# Patient Record
Sex: FEMALE | Race: WHITE | NOT HISPANIC OR LATINO
[De-identification: names, ages, dates, MRNs, and addresses within clinical notes are randomized per-mention and may not be internally consistent; named-entity substitution may affect disease eponyms.]

---

## 2017-04-25 ENCOUNTER — APPOINTMENT (OUTPATIENT)
Dept: ELECTROPHYSIOLOGY | Facility: CLINIC | Age: 52
End: 2017-04-25

## 2017-10-10 ENCOUNTER — APPOINTMENT (OUTPATIENT)
Dept: ELECTROPHYSIOLOGY | Facility: CLINIC | Age: 52
End: 2017-10-10
Payer: COMMERCIAL

## 2017-10-10 DIAGNOSIS — I63.9 CEREBRAL INFARCTION, UNSPECIFIED: ICD-10-CM

## 2017-10-10 PROCEDURE — 93298 REM INTERROG DEV EVAL SCRMS: CPT

## 2017-11-30 ENCOUNTER — APPOINTMENT (OUTPATIENT)
Dept: ELECTROPHYSIOLOGY | Facility: CLINIC | Age: 52
End: 2017-11-30

## 2018-01-02 ENCOUNTER — APPOINTMENT (OUTPATIENT)
Dept: ELECTROPHYSIOLOGY | Facility: CLINIC | Age: 53
End: 2018-01-02
Payer: COMMERCIAL

## 2018-01-02 PROCEDURE — 93298 REM INTERROG DEV EVAL SCRMS: CPT

## 2018-01-10 ENCOUNTER — APPOINTMENT (OUTPATIENT)
Dept: ELECTROPHYSIOLOGY | Facility: CLINIC | Age: 53
End: 2018-01-10

## 2018-02-22 ENCOUNTER — APPOINTMENT (OUTPATIENT)
Dept: ELECTROPHYSIOLOGY | Facility: CLINIC | Age: 53
End: 2018-02-22

## 2018-04-10 ENCOUNTER — APPOINTMENT (OUTPATIENT)
Dept: ELECTROPHYSIOLOGY | Facility: CLINIC | Age: 53
End: 2018-04-10
Payer: COMMERCIAL

## 2018-04-10 PROCEDURE — 93298 REM INTERROG DEV EVAL SCRMS: CPT

## 2018-05-22 ENCOUNTER — APPOINTMENT (OUTPATIENT)
Dept: ELECTROPHYSIOLOGY | Facility: CLINIC | Age: 53
End: 2018-05-22

## 2018-06-11 ENCOUNTER — INPATIENT (INPATIENT)
Facility: HOSPITAL | Age: 53
LOS: 0 days | Discharge: HOME | DRG: 153 | End: 2018-06-12
Attending: HOSPITALIST | Admitting: HOSPITALIST
Payer: COMMERCIAL

## 2018-06-11 VITALS
DIASTOLIC BLOOD PRESSURE: 76 MMHG | SYSTOLIC BLOOD PRESSURE: 130 MMHG | HEART RATE: 86 BPM | HEIGHT: 61 IN | RESPIRATION RATE: 18 BRPM | TEMPERATURE: 98 F | OXYGEN SATURATION: 97 % | WEIGHT: 139.99 LBS

## 2018-06-11 DIAGNOSIS — Z98.89 OTHER SPECIFIED POSTPROCEDURAL STATES: Chronic | ICD-10-CM

## 2018-06-11 DIAGNOSIS — Z29.9 ENCOUNTER FOR PROPHYLACTIC MEASURES, UNSPECIFIED: ICD-10-CM

## 2018-06-11 DIAGNOSIS — J05.10 ACUTE EPIGLOTTITIS WITHOUT OBSTRUCTION: ICD-10-CM

## 2018-06-11 DIAGNOSIS — Z98.890 OTHER SPECIFIED POSTPROCEDURAL STATES: Chronic | ICD-10-CM

## 2018-06-11 DIAGNOSIS — G45.9 TRANSIENT CEREBRAL ISCHEMIC ATTACK, UNSPECIFIED: ICD-10-CM

## 2018-06-11 DIAGNOSIS — M67.912 UNSPECIFIED DISORDER OF SYNOVIUM AND TENDON, LEFT SHOULDER: Chronic | ICD-10-CM

## 2018-06-11 DIAGNOSIS — D72.829 ELEVATED WHITE BLOOD CELL COUNT, UNSPECIFIED: ICD-10-CM

## 2018-06-11 LAB
ALBUMIN SERPL ELPH-MCNC: 3.7 G/DL — SIGNIFICANT CHANGE UP (ref 3.3–5)
ALP SERPL-CCNC: 75 U/L — SIGNIFICANT CHANGE UP (ref 40–120)
ALT FLD-CCNC: 21 U/L — SIGNIFICANT CHANGE UP (ref 12–78)
ANION GAP SERPL CALC-SCNC: 7 MMOL/L — SIGNIFICANT CHANGE UP (ref 5–17)
ANION GAP SERPL CALC-SCNC: 7 MMOL/L — SIGNIFICANT CHANGE UP (ref 5–17)
AST SERPL-CCNC: 10 U/L — LOW (ref 15–37)
BASOPHILS # BLD AUTO: 0.09 K/UL — SIGNIFICANT CHANGE UP (ref 0–0.2)
BASOPHILS NFR BLD AUTO: 1.1 % — SIGNIFICANT CHANGE UP (ref 0–2)
BILIRUB SERPL-MCNC: 0.3 MG/DL — SIGNIFICANT CHANGE UP (ref 0.2–1.2)
BUN SERPL-MCNC: 11 MG/DL — SIGNIFICANT CHANGE UP (ref 7–23)
BUN SERPL-MCNC: 14 MG/DL — SIGNIFICANT CHANGE UP (ref 7–23)
CALCIUM SERPL-MCNC: 9 MG/DL — SIGNIFICANT CHANGE UP (ref 8.5–10.1)
CALCIUM SERPL-MCNC: 9.1 MG/DL — SIGNIFICANT CHANGE UP (ref 8.5–10.1)
CHLORIDE SERPL-SCNC: 106 MMOL/L — SIGNIFICANT CHANGE UP (ref 96–108)
CHLORIDE SERPL-SCNC: 107 MMOL/L — SIGNIFICANT CHANGE UP (ref 96–108)
CO2 SERPL-SCNC: 29 MMOL/L — SIGNIFICANT CHANGE UP (ref 22–31)
CO2 SERPL-SCNC: 30 MMOL/L — SIGNIFICANT CHANGE UP (ref 22–31)
CREAT SERPL-MCNC: 0.81 MG/DL — SIGNIFICANT CHANGE UP (ref 0.5–1.3)
CREAT SERPL-MCNC: 1 MG/DL — SIGNIFICANT CHANGE UP (ref 0.5–1.3)
EOSINOPHIL # BLD AUTO: 0.16 K/UL — SIGNIFICANT CHANGE UP (ref 0–0.5)
EOSINOPHIL NFR BLD AUTO: 1.9 % — SIGNIFICANT CHANGE UP (ref 0–6)
GLUCOSE SERPL-MCNC: 120 MG/DL — HIGH (ref 70–99)
GLUCOSE SERPL-MCNC: 98 MG/DL — SIGNIFICANT CHANGE UP (ref 70–99)
HCT VFR BLD CALC: 41.6 % — SIGNIFICANT CHANGE UP (ref 34.5–45)
HCT VFR BLD CALC: 42.8 % — SIGNIFICANT CHANGE UP (ref 34.5–45)
HGB BLD-MCNC: 14 G/DL — SIGNIFICANT CHANGE UP (ref 11.5–15.5)
HGB BLD-MCNC: 14.2 G/DL — SIGNIFICANT CHANGE UP (ref 11.5–15.5)
IMM GRANULOCYTES NFR BLD AUTO: 0.2 % — SIGNIFICANT CHANGE UP (ref 0–1.5)
LACTATE SERPL-SCNC: 1 MMOL/L — SIGNIFICANT CHANGE UP (ref 0.7–2)
LYMPHOCYTES # BLD AUTO: 2.61 K/UL — SIGNIFICANT CHANGE UP (ref 1–3.3)
LYMPHOCYTES # BLD AUTO: 31.5 % — SIGNIFICANT CHANGE UP (ref 13–44)
MCHC RBC-ENTMCNC: 29.2 PG — SIGNIFICANT CHANGE UP (ref 27–34)
MCHC RBC-ENTMCNC: 29.7 PG — SIGNIFICANT CHANGE UP (ref 27–34)
MCHC RBC-ENTMCNC: 33.2 GM/DL — SIGNIFICANT CHANGE UP (ref 32–36)
MCHC RBC-ENTMCNC: 33.7 GM/DL — SIGNIFICANT CHANGE UP (ref 32–36)
MCV RBC AUTO: 87.9 FL — SIGNIFICANT CHANGE UP (ref 80–100)
MCV RBC AUTO: 88.3 FL — SIGNIFICANT CHANGE UP (ref 80–100)
MONOCYTES # BLD AUTO: 0.65 K/UL — SIGNIFICANT CHANGE UP (ref 0–0.9)
MONOCYTES NFR BLD AUTO: 7.8 % — SIGNIFICANT CHANGE UP (ref 2–14)
NEUTROPHILS # BLD AUTO: 4.76 K/UL — SIGNIFICANT CHANGE UP (ref 1.8–7.4)
NEUTROPHILS NFR BLD AUTO: 57.5 % — SIGNIFICANT CHANGE UP (ref 43–77)
NRBC # BLD: 0 /100 WBCS — SIGNIFICANT CHANGE UP (ref 0–0)
NRBC # BLD: 0 /100 WBCS — SIGNIFICANT CHANGE UP (ref 0–0)
PLATELET # BLD AUTO: 276 K/UL — SIGNIFICANT CHANGE UP (ref 150–400)
PLATELET # BLD AUTO: 278 K/UL — SIGNIFICANT CHANGE UP (ref 150–400)
POTASSIUM SERPL-MCNC: 4 MMOL/L — SIGNIFICANT CHANGE UP (ref 3.5–5.3)
POTASSIUM SERPL-MCNC: 4.2 MMOL/L — SIGNIFICANT CHANGE UP (ref 3.5–5.3)
POTASSIUM SERPL-SCNC: 4 MMOL/L — SIGNIFICANT CHANGE UP (ref 3.5–5.3)
POTASSIUM SERPL-SCNC: 4.2 MMOL/L — SIGNIFICANT CHANGE UP (ref 3.5–5.3)
PROT SERPL-MCNC: 7 G/DL — SIGNIFICANT CHANGE UP (ref 6–8.3)
RBC # BLD: 4.71 M/UL — SIGNIFICANT CHANGE UP (ref 3.8–5.2)
RBC # BLD: 4.87 M/UL — SIGNIFICANT CHANGE UP (ref 3.8–5.2)
RBC # FLD: 12.2 % — SIGNIFICANT CHANGE UP (ref 10.3–14.5)
RBC # FLD: 12.4 % — SIGNIFICANT CHANGE UP (ref 10.3–14.5)
SODIUM SERPL-SCNC: 142 MMOL/L — SIGNIFICANT CHANGE UP (ref 135–145)
SODIUM SERPL-SCNC: 144 MMOL/L — SIGNIFICANT CHANGE UP (ref 135–145)
T3 SERPL-MCNC: 113 NG/DL — SIGNIFICANT CHANGE UP (ref 80–200)
T4 AB SER-ACNC: 8 UG/DL — SIGNIFICANT CHANGE UP (ref 4.6–12)
TSH SERPL-MCNC: 2.47 UIU/ML — SIGNIFICANT CHANGE UP (ref 0.36–3.74)
WBC # BLD: 11.43 K/UL — HIGH (ref 3.8–10.5)
WBC # BLD: 8.29 K/UL — SIGNIFICANT CHANGE UP (ref 3.8–10.5)
WBC # FLD AUTO: 11.43 K/UL — HIGH (ref 3.8–10.5)
WBC # FLD AUTO: 8.29 K/UL — SIGNIFICANT CHANGE UP (ref 3.8–10.5)

## 2018-06-11 PROCEDURE — 99223 1ST HOSP IP/OBS HIGH 75: CPT | Mod: AI,GC

## 2018-06-11 PROCEDURE — 70542 MRI ORBIT/FACE/NECK W/DYE: CPT | Mod: 26

## 2018-06-11 PROCEDURE — 99285 EMERGENCY DEPT VISIT HI MDM: CPT

## 2018-06-11 PROCEDURE — 12345: CPT | Mod: NC

## 2018-06-11 PROCEDURE — 99233 SBSQ HOSP IP/OBS HIGH 50: CPT | Mod: GC

## 2018-06-11 PROCEDURE — 70491 CT SOFT TISSUE NECK W/DYE: CPT | Mod: 26

## 2018-06-11 RX ORDER — AMPICILLIN SODIUM AND SULBACTAM SODIUM 250; 125 MG/ML; MG/ML
3 INJECTION, POWDER, FOR SUSPENSION INTRAMUSCULAR; INTRAVENOUS EVERY 6 HOURS
Qty: 0 | Refills: 0 | Status: DISCONTINUED | OUTPATIENT
Start: 2018-06-11 | End: 2018-06-12

## 2018-06-11 RX ORDER — VANCOMYCIN HCL 1 G
1000 VIAL (EA) INTRAVENOUS ONCE
Qty: 0 | Refills: 0 | Status: COMPLETED | OUTPATIENT
Start: 2018-06-11 | End: 2018-06-11

## 2018-06-11 RX ORDER — ASPIRIN/CALCIUM CARB/MAGNESIUM 324 MG
81 TABLET ORAL DAILY
Qty: 0 | Refills: 0 | Status: DISCONTINUED | OUTPATIENT
Start: 2018-06-11 | End: 2018-06-12

## 2018-06-11 RX ORDER — DEXAMETHASONE 0.5 MG/5ML
10 ELIXIR ORAL ONCE
Qty: 0 | Refills: 0 | Status: COMPLETED | OUTPATIENT
Start: 2018-06-11 | End: 2018-06-11

## 2018-06-11 RX ORDER — VANCOMYCIN HCL 1 G
VIAL (EA) INTRAVENOUS
Qty: 0 | Refills: 0 | Status: DISCONTINUED | OUTPATIENT
Start: 2018-06-11 | End: 2018-06-11

## 2018-06-11 RX ORDER — VANCOMYCIN HCL 1 G
1000 VIAL (EA) INTRAVENOUS EVERY 12 HOURS
Qty: 0 | Refills: 0 | Status: DISCONTINUED | OUTPATIENT
Start: 2018-06-11 | End: 2018-06-11

## 2018-06-11 RX ORDER — ENOXAPARIN SODIUM 100 MG/ML
40 INJECTION SUBCUTANEOUS DAILY
Qty: 0 | Refills: 0 | Status: DISCONTINUED | OUTPATIENT
Start: 2018-06-11 | End: 2018-06-12

## 2018-06-11 RX ORDER — CEFTRIAXONE 500 MG/1
1 INJECTION, POWDER, FOR SOLUTION INTRAMUSCULAR; INTRAVENOUS EVERY 24 HOURS
Qty: 0 | Refills: 0 | Status: DISCONTINUED | OUTPATIENT
Start: 2018-06-11 | End: 2018-06-11

## 2018-06-11 RX ORDER — LORATADINE 10 MG/1
10 TABLET ORAL DAILY
Qty: 0 | Refills: 0 | Status: DISCONTINUED | OUTPATIENT
Start: 2018-06-11 | End: 2018-06-12

## 2018-06-11 RX ORDER — KETOROLAC TROMETHAMINE 30 MG/ML
30 SYRINGE (ML) INJECTION ONCE
Qty: 0 | Refills: 0 | Status: DISCONTINUED | OUTPATIENT
Start: 2018-06-11 | End: 2018-06-11

## 2018-06-11 RX ORDER — PANTOPRAZOLE SODIUM 20 MG/1
40 TABLET, DELAYED RELEASE ORAL DAILY
Qty: 0 | Refills: 0 | Status: DISCONTINUED | OUTPATIENT
Start: 2018-06-11 | End: 2018-06-11

## 2018-06-11 RX ORDER — CEFTRIAXONE 500 MG/1
1 INJECTION, POWDER, FOR SOLUTION INTRAMUSCULAR; INTRAVENOUS ONCE
Qty: 0 | Refills: 0 | Status: COMPLETED | OUTPATIENT
Start: 2018-06-11 | End: 2018-06-11

## 2018-06-11 RX ORDER — DEXAMETHASONE 0.5 MG/5ML
4 ELIXIR ORAL EVERY 6 HOURS
Qty: 0 | Refills: 0 | Status: DISCONTINUED | OUTPATIENT
Start: 2018-06-11 | End: 2018-06-12

## 2018-06-11 RX ADMIN — Medication 102 MILLIGRAM(S): at 01:41

## 2018-06-11 RX ADMIN — Medication 30 MILLIGRAM(S): at 01:41

## 2018-06-11 RX ADMIN — Medication 10 MILLIGRAM(S): at 03:36

## 2018-06-11 RX ADMIN — CEFTRIAXONE 1 GRAM(S): 500 INJECTION, POWDER, FOR SOLUTION INTRAMUSCULAR; INTRAVENOUS at 03:36

## 2018-06-11 RX ADMIN — AMPICILLIN SODIUM AND SULBACTAM SODIUM 200 GRAM(S): 250; 125 INJECTION, POWDER, FOR SUSPENSION INTRAMUSCULAR; INTRAVENOUS at 23:57

## 2018-06-11 RX ADMIN — Medication 81 MILLIGRAM(S): at 13:19

## 2018-06-11 RX ADMIN — Medication 250 MILLIGRAM(S): at 03:54

## 2018-06-11 RX ADMIN — Medication 4 MILLIGRAM(S): at 06:26

## 2018-06-11 RX ADMIN — Medication 4 MILLIGRAM(S): at 11:22

## 2018-06-11 RX ADMIN — AMPICILLIN SODIUM AND SULBACTAM SODIUM 200 GRAM(S): 250; 125 INJECTION, POWDER, FOR SUSPENSION INTRAMUSCULAR; INTRAVENOUS at 17:24

## 2018-06-11 RX ADMIN — Medication 30 MILLIGRAM(S): at 02:11

## 2018-06-11 RX ADMIN — CEFTRIAXONE 100 GRAM(S): 500 INJECTION, POWDER, FOR SOLUTION INTRAMUSCULAR; INTRAVENOUS at 03:06

## 2018-06-11 RX ADMIN — PANTOPRAZOLE SODIUM 40 MILLIGRAM(S): 20 TABLET, DELAYED RELEASE ORAL at 11:22

## 2018-06-11 RX ADMIN — Medication 4 MILLIGRAM(S): at 17:24

## 2018-06-11 RX ADMIN — Medication 4 MILLIGRAM(S): at 23:57

## 2018-06-11 RX ADMIN — ENOXAPARIN SODIUM 40 MILLIGRAM(S): 100 INJECTION SUBCUTANEOUS at 13:19

## 2018-06-11 NOTE — CONSULT NOTE ADULT - SUBJECTIVE AND OBJECTIVE BOX
Patient with 2 weeks of sore throat on antibiotics and steroids. pain not responding to meds started with left ear pain and increased difficulty swallowing. came to ER ct scan of neck  read as epiglottitis.  minimal wbc elevation after steroids. still with pain. on exam necjk negative. oral exam negative. nasal deviated septum, mild.  flex laryngoscopy. mild clear edema tip of epiflottis. normal nasopharynx, base of tongue and larynx. no pooling, no erythema, no true epiglotitis and no obvious etiology for throat pain and dysphagia

## 2018-06-11 NOTE — PROVIDER CONTACT NOTE (EICU) - SITUATION
ICU admission discussed with ICU PA.  Admit to ICU with impending airway compromise from Epiglottitis, while ruling out Lemierre's disease.   Recommend Rocephin + Vanco + steroids pending cultures and review of CT with contrast of neck/soft tissue by radiologist to rule out thrombophlebitis of left IJ vein.

## 2018-06-11 NOTE — ED PROVIDER NOTE - OBJECTIVE STATEMENT
54yo female who presents with difficulty swallowing for 3 days. pt c/o throat pain, was seen by pmd and given amoxil, was not getting better and went to urgent care and given prednisone and was better for the first 2 days and now getting worse, pain with swallowing, no drooling, no change invoice, no weight loss or gain, no fever, chills, pt has appt this thursday with the ENT but could not wait due to the pain

## 2018-06-11 NOTE — H&P ADULT - HISTORY OF PRESENT ILLNESS
53 y.o. F w/ PMH of migraine and possible TIA (9/2015) presents to ED w/ difficulty swallowing and sore throat x 3 weeks.  Patient reports that after a trip to [place] she returned with a sore throat, cough and difficulty swallowing.  She reports going to her PMD and being placed on amoxicillin, and completing a 10 day course, that did not improve her symptoms.  Afterwards she went to an urgent care and was placed on prednisone, from which she felt minimal improvement for a day or two.  The day of admission patient reports 10/10 throat pain, with difficulty swallowing and pressure that radiated up into her ears.    In ED patient vitals are WNL.  Labs are unremarkable.  CT neck soft tissue shows acute epiglottitis versus supraglottitis.

## 2018-06-11 NOTE — PROGRESS NOTE ADULT - ASSESSMENT
52 yo f with history of TIA, Crones disease (in remission), tonsillectomy, admitted with sore throat found to have epiglottitis possibly supraglottitis with difficulty swallowing saliva but no stridor or tripoding.    neuro: stable  HEENT: ENT note appreciated: scope showed no evidence of epiglottitis, on unasyn and decadron, follow up MRI, feels improved  Cardio: hemodynamically stable  pulm: sating well  endo: no needs  GI: soft diet, d/c protonix  ID: on unasyn, cont. augmentin and decadron upon discharge, follow up cx, wbc increasing- likely reactive (  Heme: h/h stable, lovenox and aspirin  renal: no needs

## 2018-06-11 NOTE — ED PROVIDER NOTE - PMH
Migraine    Seasonal allergies    Transient cerebral ischemia, unspecified transient cerebral ischemia type

## 2018-06-11 NOTE — H&P ADULT - NSHPSOCIALHISTORY_GEN_ALL_CORE
Patient lives at home w/  and 3 adult children.  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  Patient denies smoking, alcohol consumption or other substance use.

## 2018-06-11 NOTE — PROGRESS NOTE ADULT - SUBJECTIVE AND OBJECTIVE BOX
HPI:  53 y.o. F w/ PMH of migraine and possible TIA (9/2015) presents to ED w/ difficulty swallowing and sore throat x 3 weeks.  Patient reports that after a trip to [place] she returned with a sore throat, cough and difficulty swallowing.  She reports going to her PMD and being placed on amoxicillin, and completing a 10 day course, that did not improve her symptoms.  Afterwards she went to an urgent care and was placed on prednisone, from which she felt minimal improvement for a day or two.  The day of admission patient reports 10/10 throat pain, with difficulty swallowing and pressure that radiated up into her ears.    In ED patient vitals are WNL.  Labs are unremarkable.  CT neck soft tissue shows acute epiglottitis versus supraglottitis. (11 Jun 2018 03:30)      INTERVAL EVENTS: Patient seen and examined. Much improved. Still with left sided neck pain. Had scope by ENT which showed no epiglotitis. Awaiting MRI to r/o retropharyngeal abscess. Denied difficulty breathing. No chest pain, palpitations.     REVIEW OF SYSTEMS:    CONSTITUTIONAL: No weakness, fevers or chills  EYES/ENT: No visual changes, +throat pain, +left sided neck pain.  RESPIRATORY: No cough, wheezing, hemoptysis; No shortness of breath  CARDIOVASCULAR: No chest pain or palpitations  GASTROINTESTINAL: No abdominal pain, nausea, vomiting, or hematemesis; No diarrhea or constipation. No melena or hematochezia.  GENITOURINARY: No dysuria, frequency or hematuria  NEUROLOGICAL: No dizziness, numbness, or weakness  SKIN: No itching, burning, rashes, or lesions   All other review of systems is negative unless indicated above.    VITAL SIGNS:  Vital Signs Last 24 Hrs  T(C): 36.8 (06-11-18 @ 21:42), Max: 36.9 (06-11-18 @ 11:15)  T(F): 98.3 (06-11-18 @ 21:42), Max: 98.4 (06-11-18 @ 11:15)  HR: 80 (06-11-18 @ 21:42) (66 - 97)  BP: 119/77 (06-11-18 @ 21:42) (102/59 - 153/74)  BP(mean): 95 (06-11-18 @ 21:00) (74 - 112)  RR: 28 (06-11-18 @ 21:00) (11 - 30)  SpO2: 96% (06-11-18 @ 21:42) (92% - 97%)      PHYSICAL EXAM:     GENERAL: no acute distress  HEENT: NC/AT, EOMI, neck supple, MMM, tenderness to palpation over left side of neck.  RESPIRATORY: LCTAB/L, no rhonchi, rales, or wheezing  CARDIOVASCULAR: RRR, no murmurs, gallops, rubs  ABDOMINAL: soft, non-tender, non-distended, positive bowel sounds   EXTREMITIES: no clubbing, cyanosis, or edema  NEUROLOGICAL: alert and oriented x 3, non-focal  SKIN: no rashes or lesions   MUSCULOSKELETAL: no gross joint deformity                          14.2   11.43 )-----------( 278      ( 11 Jun 2018 06:16 )             42.8     06-11    142  |  106  |  11  ----------------------------<  120<H>  4.2   |  29  |  0.81    Ca    9.0      11 Jun 2018 06:16    TPro  7.0  /  Alb  3.7  /  TBili  0.3  /  DBili  x   /  AST  10<L>  /  ALT  21  /  AlkPhos  75  06-11          MEDICATIONS  (STANDING):  ampicillin/sulbactam  IVPB 3 Gram(s) IV Intermittent every 6 hours  aspirin enteric coated 81 milliGRAM(s) Oral daily  dexamethasone  Injectable 4 milliGRAM(s) IV Push every 6 hours  enoxaparin Injectable 40 milliGRAM(s) SubCutaneous daily  loratadine 10 milliGRAM(s) Oral daily    MEDICATIONS  (PRN):

## 2018-06-11 NOTE — CONSULT NOTE ADULT - SUBJECTIVE AND OBJECTIVE BOX
Patient is a 53y old  Female who presents with a chief complaint of Throat pain x 3 weeks got worse past 3 days (2018 04:40)    HPI: 52yo F with history of TIA, Crones disease, seasonal allergies, tonsillectomy presents with sore throat x 4 weeks, CT scan Neck concerning for epiglottitis/supraglottitis. Patient began having sore throat around May 17th after caring for her 9 month old grandson who was sick with a URI.  At that time she took 7-10 days worth of amoxacillin that she had left over from a previous prescription.  There was minimal relief and she sought at urgent care last week who prescribed a tapering course of prednisone.  She felt better for the first 3 days but over the last two her sore throat has become worse.  She describes having difficulty swallowing food and even saliva because of the pain. Also describes green mucus production worse in the morning but also throughout the day.  Yesterday evening she attempted to go to sleep at 730pm but was unable to sleep because of the pain and difficulty breathing prompting her to come to ED.  In the ED she was afebrile, hemodynamically stable,  Her voice was initially raspy, she was given decardon with improvement in pain and voice.  She was also given toradol vancomycin and ceftriaxone.  She admits to sore throat and difficulty swallowing saliva (more because of pain then obstruction), and some difficulty breathing yesterday evening but not currently.  Denies stridor, fever, chills.         PAST MEDICAL & SURGICAL HISTORY:  Transient cerebral ischemia, unspecified transient cerebral ischemia type  Seasonal allergies  Migraine  Disorder of left rotator cuff  H/O eye surgery  History of tonsillectomy  History of appendectomy  H/O: : x 4      Review of Systems:  CONSTITUTIONAL: No fever, chills, or fatigue or recent weight changes  EYES: No eye pain, visual disturbances, or discharge  ENMT:  throat pain radiating to ears, see HPI  NECK: left sided neck pain to palpation.   RESPIRATORY: green phlegm   CARDIOVASCULAR: No chest pain, palpitations, dizziness, or leg swelling  GASTROINTESTINAL: No abdominal or epigastric pain. No nausea, vomiting, or hematemesis; No diarrhea or constipation. No melena or hematochezia.  GENITOURINARY: No dysuria, frequency, hematuria, or incontinence  NEUROLOGICAL: No headaches,, or tremors    Medications:  vancomycin  IVPB      vancomycin  IVPB 1000 milliGRAM(s) IV Intermittent every 12 hours      loratadine 10 milliGRAM(s) Oral daily        aspirin enteric coated 81 milliGRAM(s) Oral daily    pantoprazole  Injectable 40 milliGRAM(s) IV Push daily      dexamethasone  Injectable 4 milliGRAM(s) IV Push every 6 hours                  ICU Vital Signs Last 24 Hrs  T(C): 36.6 (2018 04:00), Max: 36.7 (2018 01:07)  T(F): 97.8 (2018 04:00), Max: 98.1 (2018 01:07)  HR: 72 (2018 04:00) (72 - 86)  BP: 132/83 (2018 04:00) (130/76 - 132/83)  BP(mean): --  ABP: --  ABP(mean): --  RR: 17 (2018 04:00) (17 - 18)  SpO2: 96% (2018 04:00) (96% - 97%)          I&O's Detail        LABS:                        14.0   8.29  )-----------( 276      ( 2018 01:41 )             41.6     06-11    144  |  107  |  14  ----------------------------<  98  4.0   |  30  |  1.00    Ca    9.1      2018 01:41    TPro  7.0  /  Alb  3.7  /  TBili  0.3  /  DBili  x   /  AST  10<L>  /  ALT  21  /  AlkPhos  75  06-11          CAPILLARY BLOOD GLUCOSE            CULTURES:      Physical Examination:    General: awake and alert    HEENT: Pupils equal, reactive to light.  Symmetric. non icteric sclera     PULM: clear to ascultation, no stridor    CVS: Regular rate and rhythm, no murmurs, rubs, or gallops    ABD: Soft, nondistended, nontender, normoactive bowel sounds, no masses    EXT: No edema, nontender    SKIN: Warm and well perfused, no rashes noted.    RADIOLOGY: CT neck 6/10  IMPRESSION:   Edema of the epiglottis with thickening of the aryepiglottic folds, left   greater than right, causing effacement of the left piriform sinus,   suspicious for epiglottitis/supraglottitis. Enhancing soft tissue effacing   the valleculae, likely represents lingual tonsillar hypertrophy.     ENT follow-up with laryngoscopy recommended to exclude presence of mass.     54 yo f with history of TIA, Crones disease (in remission), tonsillectomy, admitted with sore throat found to have epiglottitis possibly supraglottitis with difficulty swallowing saliva but no stridor or tripoding.  I spoke with Radiologist, there is no thrombophlebitis on CT scan.   - Continue vanco and ceftriaxone  - follow up cultures  - continue decardron 4mg q6h  - NPO  - Keep cricothyrotomy kit at bedside.  - ENT consult called  - Patient is at risk for life threatening airway compromise, monitor closely in ICU    discussed with Dr. Sheikh CUADRA       CRITICAL CARE TIME SPENT: 45 min including time spent reviewing chart, discussing care with patient, coordinating care with multidisciplinary team, ordering tests and medications, not including procedures

## 2018-06-11 NOTE — ED ADULT NURSE NOTE - CHIEF COMPLAINT QUOTE
Patient complaining of pain to left side of the neck  with pain level 10/10 and stated she cannot swallow for 2 weeks no labored breathing was seen at urgent care a week ago and was prescribed with prednisone and completed the treatment no relief was advised to go to ENT but her schedule is on Thursday and she cannot bear the pain anymore, denies taking any  pain medication.

## 2018-06-11 NOTE — ED PROVIDER NOTE - PSH
Disorder of left rotator cuff    H/O eye surgery    H/O:   x 4  History of appendectomy    History of tonsillectomy

## 2018-06-11 NOTE — CONSULT NOTE ADULT - ATTENDING COMMENTS
I interviewed patient, reviewed ct scan, examined patient performed endoscopy, discussed with intensivist and authored this note

## 2018-06-11 NOTE — ED ADULT TRIAGE NOTE - CHIEF COMPLAINT QUOTE
Patient complaining of pain to left side of the neck  with pain level 10/10 and stated she cannot swallow for 2 weeks no labored breathing was seen at urgent care a week ago and was prescribed with prednisone and completed the treatment no relief was advised to go to ENT but her schedule is on Thursday and she cannot bear the pain anymore, denies taking any  pain medication. Patient complaining of pain to left side of the neck  with pain level 10/10 and stated she cannot swallow for 2 weeks no labored breathing was seen at urgent care a week ago and was prescribed with prednisone and completed the treatment no relief was advised to go to ENT but her schedule is on Thursday and she cannot bear the pain anymore, denies taking any  pain medication. Stated she was treated for sore throat 3 weeks ago and completed amoxicillin for 7 days.

## 2018-06-11 NOTE — ED ADULT TRIAGE NOTE - NSWEIGHTCALCTOOLDRUG_GEN_A_CORE
[employer] and family at the bedside.
Bedside and Verbal shift change report received from Marcy Cavazos [facility] (offgoing nurse). Report included the following information SBAR, ED Summary, MAR and Recent Results.
Pt provided with meal and beverage. Pt tolerating meal well. Pt has mother in law at the bedside and is visible from nurses station. Pt has no complaints at this time. Will continue to monitor.
RN will call when ready to receive pt. Pt resting comfortably on stretcher at this time with family at the bedside. Pt updated on room status.
Spoke with [facility]. Will try in 15 min to see if they are ready for pt.
 used

## 2018-06-11 NOTE — H&P ADULT - NSHPREVIEWOFSYSTEMS_GEN_ALL_CORE
Constitutional: Denies fever, chills, general malaise, weight loss, weight gain, diaphoresis   HEENT: + sore throat, difficulty swallowing;  Denies runny nose, photophobia, blurry vision, double vision, eye pain, difficulty hearing, dizziness, dysphagia, epistaxis  Respiratory: + cough, sputum production; Denies shortness of breath, dyspnea on exertion,  wheezing, hemoptysis  Cardiovascular: Denies chest pain, palpitations, edema  Gastrointestinal: Denies nausea, vomiting, diarrhea, constipation, abdominal pain, melena, hematochezia   Genitourinary: Denies dysuria, hematuria, frequency, urgency, incontinence  Skin/Breast: Denies rash, hives, itching  Musculoskeletal: Denies muscle pains, muscle weakness, joint pain or swelling  Neurologic: Denies syncope, loss of consciousness, headache, weakness, dizziness, paresthesias, numbness, tingling, confusion, dementia   Psychiatric: Denies feeling anxious, depressed, suicidal, or homicidal thoughts  Endocrine: Denies cold or heat intolerance, polydipsia, polyphagia   Hematology/Oncology: Denies abnormal bruising, tender or enlarged lymph nodes   ROS negative except as noted above

## 2018-06-11 NOTE — H&P ADULT - FAMILY HISTORY
Uncle  Still living? No  Family history of heart attack, Age at diagnosis: Age Unknown  Family history of heart attack, Age at diagnosis: Age Unknown     Sibling  Still living? Yes, Estimated age: Age Unknown  Family history of arrhythmia, Age at diagnosis: Age Unknown

## 2018-06-11 NOTE — DIETITIAN INITIAL EVALUATION ADULT. - PROBLEM SELECTOR PLAN 3
IMPROVE VTE Individual Risk Assessment        RISK                                                          Points  [  ] Previous VTE                                                3  [  ] Thrombophilia                                             2  [  ] Lower limb paralysis                                   2        (unable to hold up >15 seconds)    [  ] Current Cancer                                            2         (within 6 months)  [  ] Immobilization > 24 hrs                              1  [  ] ICU/CCU stay > 24 hours                            1  [  ] Age > 60                                                    1  IMPROVE VTE Score ___0__    SCD's for DVT prophylaxis and encourage ambulation  Protonix for GI prophylaxis ( received corticosteroid use, concurrent infection, ICU setting)

## 2018-06-11 NOTE — PROGRESS NOTE ADULT - SUBJECTIVE AND OBJECTIVE BOX
Interval events: scope this AM by ENT, no evidence of epiglottitis, pt. feeling better, for MRI today.     Review of Systems:  Constitutional: no fever, chills, fatigue  HEENT: difficulty swallowing   Neuro: no headache, numbness, weakness  Resp: no cough, wheezing, shortness of breath  CVS: no chest pain, palpitations, leg swelling  GI: no abdominal pain, nausea, vomiting, diarrhea   : no dysuria, frequency, incontinence  Skin: no itching, burning, rashes, or lesions   Msk: no joint pain or swelling  Psych: no depression, anxiety    T(F): 98.4 (06-11-18 @ 11:15), Max: 98.4 (06-11-18 @ 11:15)  HR: 85 (06-11-18 @ 14:00) (66 - 93)  BP: 102/59 (06-11-18 @ 14:00) (102/59 - 138/77)  RR: 15 (06-11-18 @ 14:00) (11 - 38)  SpO2: 94% (06-11-18 @ 14:00) (94% - 97%)        CAPILLARY BLOOD GLUCOSE        I&O's Summary    10 [place] 2018 07:01  -  11 Jun 2018 07:00  --------------------------------------------------------  IN: 250 mL / OUT: 0 mL / NET: 250 mL    11 Jun 2018 07:01  -  11 Jun 2018 15:52  --------------------------------------------------------  IN: 0 mL / OUT: 350 mL / NET: -350 mL        Physical Exam:     General: awake and alert  HEENT: no evidence of pharyngeal obstruction, Pupils equal, reactive to light.  Symmetric. non icteric sclera, tender anterior neck   PULM: clear to ascultation, no stridor  CVS: Regular rate and rhythm, no murmurs, rubs, or gallops  ABD: Soft, nondistended, nontender, normoactive bowel sounds, no masses  EXT: No edema, nontender  SKIN: Warm and well perfused, no rashes noted.    Meds:  aspirin enteric coated 81 milliGRAM(s) Oral daily  enoxaparin Injectable 40 milliGRAM(s) SubCutaneous daily    ampicillin/sulbactam  IVPB 3 Gram(s) IV Intermittent every 6 hours      dexamethasone  Injectable 4 milliGRAM(s) IV Push every 6 hours    loratadine 10 milliGRAM(s) Oral daily                        14.2   11.43 )-----------( 278      ( 11 Jun 2018 06:16 )             42.8       06-11    142  |  106  |  11  ----------------------------<  120<H>  4.2   |  29  |  0.81    Ca    9.0      11 Jun 2018 06:16    TPro  7.0  /  Alb  3.7  /  TBili  0.3  /  DBili  x   /  AST  10<L>  /  ALT  21  /  AlkPhos  75  06-11    Lactate 1.0           06-11 @ 03:17      Radiology: MRI pending, scope with [place]    Bedside Lung U/S: no    Bedside Cardiac U/S: no    CENTRAL LINE: N       ROSEN: N    A-LINE: N    GLOBAL ISSUE/BEST PRACTICE:  Analgesia: no  Sedation: no  HOB elevation: yes  Stress ulcer prophylaxis: no  VTE prophylaxis: lovenox 40 daily, aspirin  Glycemic control: no  Nutrition: soft diet    CODE STATUS: full Interval events: scope this AM by ENT, no evidence of epiglottitis, pt. feeling better, for MRI today.     Review of Systems:  Constitutional: no fever, chills, fatigue  HEENT: difficulty swallowing   Neuro: no headache, numbness, weakness  Resp: no cough, wheezing, shortness of breath  CVS: no chest pain, palpitations, leg swelling  GI: no abdominal pain, nausea, vomiting, diarrhea   : no dysuria, frequency, incontinence  Skin: no itching, burning, rashes, or lesions   Msk: no joint pain or swelling  Psych: no depression, anxiety    T(F): 98.4 (06-11-18 @ 11:15), Max: 98.4 (06-11-18 @ 11:15)  HR: 85 (06-11-18 @ 14:00) (66 - 93)  BP: 102/59 (06-11-18 @ 14:00) (102/59 - 138/77)  RR: 15 (06-11-18 @ 14:00) (11 - 38)  SpO2: 94% (06-11-18 @ 14:00) (94% - 97%)      I&O's Summary    10 [place] 2018 07:01  -  11 Jun 2018 07:00  --------------------------------------------------------  IN: 250 mL / OUT: 0 mL / NET: 250 mL    Physical Exam:     General: awake and alert  HEENT: no evidence of pharyngeal obstruction, Pupils equal, reactive to light.  Symmetric. non icteric sclera, tender anterior neck   PULM: clear to ascultation, no stridor  CVS: Regular rate and rhythm, no murmurs, rubs, or gallops  ABD: Soft, nondistended, nontender, normoactive bowel sounds, no masses  EXT: No edema, nontender  SKIN: Warm and well perfused, no rashes noted.    Meds:  aspirin enteric coated 81 milliGRAM(s) Oral daily  enoxaparin Injectable 40 milliGRAM(s) SubCutaneous daily    ampicillin/sulbactam  IVPB 3 Gram(s) IV Intermittent every 6 hours      dexamethasone  Injectable 4 milliGRAM(s) IV Push every 6 hours    loratadine 10 milliGRAM(s) Oral daily                        14.2   11.43 )-----------( 278      ( 11 Jun 2018 06:16 )             42.8       06-11    142  |  106  |  11  ----------------------------<  120<H>  4.2   |  29  |  0.81    Ca    9.0      11 Jun 2018 06:16    TPro  7.0  /  Alb  3.7  /  TBili  0.3  /  DBili  x   /  AST  10<L>  /  ALT  21  /  AlkPhos  75  06-11    Lactate 1.0           06-11 @ 03:17      Radiology: MRI pending, scope with [facility]    Bedside Lung U/S: no    Bedside Cardiac U/S: no    CENTRAL LINE: N       ROSEN: N    A-LINE: N    GLOBAL ISSUE/BEST PRACTICE:  Analgesia: no  Sedation: no  HOB elevation: yes  Stress ulcer prophylaxis: no  VTE prophylaxis: lovenox 40 daily, aspirin  Glycemic control: no  Nutrition: soft diet    CODE STATUS: full

## 2018-06-11 NOTE — H&P ADULT - NSHPPHYSICALEXAM_GEN_ALL_CORE
Physical Exam:  General: Well developed, well nourished, No Acute Distress  HEENT: Normocephallic Atraumatic, PERRLA, EOMI bl, dry mucous membranes  Neck: Supple, nontender, no mass  Neurology: AA&Ox3, CN II-XII grossly intact, sensation intact  Respiratory: Clear To Auscultation B/L, No Wheezes, rhonchi or rales  CV: Regular Rate and Rhythm, +S1/S2, no murmurs, rubs or gallops  Abdominal: Soft, Non-Tender, Non-Distended +Bowel Soundsx4  Extremities: No Clubbing, cyanosis or edema, + peripheral pulses  Musculoskeletal: Normal Range of motion, no joint erythema or warmth, no joint swelling   Skin: warm, dry, normal color, no rash or abnormal lesions Physical Exam:  General: Well developed, well nourished, No Acute Distress  HEENT: Normocephallic Atraumatic, PERRLA, EOMI bl, dry mucous membranes: no signs of erythema in the oropharynx;   Neck: Supple, nontender, no mass; no cervical or supraclavicular lymphadenopathy  Neurology: AA&Ox3, CN II-XII grossly intact, sensation intact  Respiratory: Clear To Auscultation B/L, No Wheezes, rhonchi or rales  CV: Regular Rate and Rhythm, +S1/S2, no murmurs, rubs or gallops  Abdominal: Soft, Non-Tender, Non-Distended +Bowel Soundsx4  Extremities: No Clubbing, cyanosis or edema, + peripheral pulses  Musculoskeletal: Normal Range of motion, no joint erythema or warmth, no joint swelling   Skin: warm, dry, normal color, no rash or abnormal lesions

## 2018-06-11 NOTE — PROGRESS NOTE ADULT - PROBLEM SELECTOR PLAN 1
- treated with Vanco and Rocephin, now on Unasyn.  - continue Decadron  - eating and tolerating diet.   - f/u MRI

## 2018-06-11 NOTE — H&P ADULT - ASSESSMENT
53 y.o. F w/ PMH of migraine and possible TIA (9/2015) presents to ED w/ difficulty swallowing and sore throat x 3 weeks; admitted w/ epiglottitis.

## 2018-06-11 NOTE — H&P ADULT - PROBLEM SELECTOR PLAN 1
- admit to ICU for observation  - continue Vanco and Rocephin  - continue Decadron  - NPO  - ambulate as tolerated

## 2018-06-11 NOTE — H&P ADULT - PROBLEM SELECTOR PLAN 3
IMPROVE VTE Individual Risk Assessment        RISK                                                          Points  [  ] Previous VTE                                                3  [  ] Thrombophilia                                             2  [  ] Lower limb paralysis                                   2        (unable to hold up >15 seconds)    [  ] Current Cancer                                            2         (within 6 months)  [  ] Immobilization > 24 hrs                              1  [  ] ICU/CCU stay > 24 hours                            1  [  ] Age > 60                                                    1  IMPROVE VTE Score ___0__    SCD's for DVT prophylaxis and encourage ambulation  Protonix for GI prophylaxis IMPROVE VTE Individual Risk Assessment        RISK                                                          Points  [  ] Previous VTE                                                3  [  ] Thrombophilia                                             2  [  ] Lower limb paralysis                                   2        (unable to hold up >15 seconds)    [  ] Current Cancer                                            2         (within 6 months)  [  ] Immobilization > 24 hrs                              1  [  ] ICU/CCU stay > 24 hours                            1  [  ] Age > 60                                                    1  IMPROVE VTE Score ___0__    SCD's for DVT prophylaxis and encourage ambulation  Protonix for GI prophylaxis ( received corticosteroid use, concurrent infection, ICU setting)

## 2018-06-11 NOTE — CONSULT NOTE ADULT - PROBLEM SELECTOR RECOMMENDATION 9
cobtinue IV antibiotics and steroids. no resolution of pain and dysphagia MRI with contrast to evaluate soft tissue and tongue base

## 2018-06-12 ENCOUNTER — TRANSCRIPTION ENCOUNTER (OUTPATIENT)
Age: 53
End: 2018-06-12

## 2018-06-12 VITALS
RESPIRATION RATE: 16 BRPM | DIASTOLIC BLOOD PRESSURE: 62 MMHG | TEMPERATURE: 98 F | OXYGEN SATURATION: 97 % | SYSTOLIC BLOOD PRESSURE: 105 MMHG | HEART RATE: 70 BPM

## 2018-06-12 LAB
ANION GAP SERPL CALC-SCNC: 9 MMOL/L — SIGNIFICANT CHANGE UP (ref 5–17)
BASOPHILS # BLD AUTO: 0.01 K/UL — SIGNIFICANT CHANGE UP (ref 0–0.2)
BASOPHILS NFR BLD AUTO: 0.1 % — SIGNIFICANT CHANGE UP (ref 0–2)
BUN SERPL-MCNC: 16 MG/DL — SIGNIFICANT CHANGE UP (ref 7–23)
CALCIUM SERPL-MCNC: 9.2 MG/DL — SIGNIFICANT CHANGE UP (ref 8.5–10.1)
CHLORIDE SERPL-SCNC: 106 MMOL/L — SIGNIFICANT CHANGE UP (ref 96–108)
CO2 SERPL-SCNC: 27 MMOL/L — SIGNIFICANT CHANGE UP (ref 22–31)
CREAT SERPL-MCNC: 0.81 MG/DL — SIGNIFICANT CHANGE UP (ref 0.5–1.3)
EOSINOPHIL # BLD AUTO: 0 K/UL — SIGNIFICANT CHANGE UP (ref 0–0.5)
EOSINOPHIL NFR BLD AUTO: 0 % — SIGNIFICANT CHANGE UP (ref 0–6)
GLUCOSE SERPL-MCNC: 133 MG/DL — HIGH (ref 70–99)
HCT VFR BLD CALC: 38.5 % — SIGNIFICANT CHANGE UP (ref 34.5–45)
HGB BLD-MCNC: 13 G/DL — SIGNIFICANT CHANGE UP (ref 11.5–15.5)
IMM GRANULOCYTES NFR BLD AUTO: 0.6 % — SIGNIFICANT CHANGE UP (ref 0–1.5)
LYMPHOCYTES # BLD AUTO: 0.65 K/UL — LOW (ref 1–3.3)
LYMPHOCYTES # BLD AUTO: 4.2 % — LOW (ref 13–44)
MAGNESIUM SERPL-MCNC: 2.3 MG/DL — SIGNIFICANT CHANGE UP (ref 1.6–2.6)
MCHC RBC-ENTMCNC: 29.1 PG — SIGNIFICANT CHANGE UP (ref 27–34)
MCHC RBC-ENTMCNC: 33.8 GM/DL — SIGNIFICANT CHANGE UP (ref 32–36)
MCV RBC AUTO: 86.3 FL — SIGNIFICANT CHANGE UP (ref 80–100)
MONOCYTES # BLD AUTO: 0.32 K/UL — SIGNIFICANT CHANGE UP (ref 0–0.9)
MONOCYTES NFR BLD AUTO: 2 % — SIGNIFICANT CHANGE UP (ref 2–14)
NEUTROPHILS # BLD AUTO: 14.59 K/UL — HIGH (ref 1.8–7.4)
NEUTROPHILS NFR BLD AUTO: 93.1 % — HIGH (ref 43–77)
PHOSPHATE SERPL-MCNC: 4.1 MG/DL — SIGNIFICANT CHANGE UP (ref 2.5–4.5)
PLATELET # BLD AUTO: 299 K/UL — SIGNIFICANT CHANGE UP (ref 150–400)
POTASSIUM SERPL-MCNC: 4.2 MMOL/L — SIGNIFICANT CHANGE UP (ref 3.5–5.3)
POTASSIUM SERPL-SCNC: 4.2 MMOL/L — SIGNIFICANT CHANGE UP (ref 3.5–5.3)
RBC # BLD: 4.46 M/UL — SIGNIFICANT CHANGE UP (ref 3.8–5.2)
RBC # FLD: 12.1 % — SIGNIFICANT CHANGE UP (ref 10.3–14.5)
SODIUM SERPL-SCNC: 142 MMOL/L — SIGNIFICANT CHANGE UP (ref 135–145)
WBC # BLD: 15.66 K/UL — HIGH (ref 3.8–10.5)
WBC # FLD AUTO: 15.66 K/UL — HIGH (ref 3.8–10.5)

## 2018-06-12 PROCEDURE — 84480 ASSAY TRIIODOTHYRONINE (T3): CPT

## 2018-06-12 PROCEDURE — 80048 BASIC METABOLIC PNL TOTAL CA: CPT

## 2018-06-12 PROCEDURE — 83605 ASSAY OF LACTIC ACID: CPT

## 2018-06-12 PROCEDURE — 99285 EMERGENCY DEPT VISIT HI MDM: CPT | Mod: 25

## 2018-06-12 PROCEDURE — 84436 ASSAY OF TOTAL THYROXINE: CPT

## 2018-06-12 PROCEDURE — 87040 BLOOD CULTURE FOR BACTERIA: CPT

## 2018-06-12 PROCEDURE — 80053 COMPREHEN METABOLIC PANEL: CPT

## 2018-06-12 PROCEDURE — 84100 ASSAY OF PHOSPHORUS: CPT

## 2018-06-12 PROCEDURE — 84443 ASSAY THYROID STIM HORMONE: CPT

## 2018-06-12 PROCEDURE — 83735 ASSAY OF MAGNESIUM: CPT

## 2018-06-12 PROCEDURE — 87070 CULTURE OTHR SPECIMN AEROBIC: CPT

## 2018-06-12 PROCEDURE — 99239 HOSP IP/OBS DSCHRG MGMT >30: CPT

## 2018-06-12 PROCEDURE — 70542 MRI ORBIT/FACE/NECK W/DYE: CPT

## 2018-06-12 PROCEDURE — 85027 COMPLETE CBC AUTOMATED: CPT

## 2018-06-12 PROCEDURE — A9579: CPT

## 2018-06-12 PROCEDURE — 70491 CT SOFT TISSUE NECK W/DYE: CPT

## 2018-06-12 RX ORDER — ASPIRIN/CALCIUM CARB/MAGNESIUM 324 MG
1 TABLET ORAL
Qty: 0 | Refills: 0 | DISCHARGE
Start: 2018-06-12

## 2018-06-12 RX ORDER — ASPIRIN/CALCIUM CARB/MAGNESIUM 324 MG
0 TABLET ORAL
Qty: 0 | Refills: 0 | COMMUNITY

## 2018-06-12 RX ORDER — DEXAMETHASONE 0.5 MG/5ML
4 ELIXIR ORAL EVERY 8 HOURS
Qty: 0 | Refills: 0 | Status: DISCONTINUED | OUTPATIENT
Start: 2018-06-12 | End: 2018-06-12

## 2018-06-12 RX ADMIN — Medication 4 MILLIGRAM(S): at 06:43

## 2018-06-12 RX ADMIN — AMPICILLIN SODIUM AND SULBACTAM SODIUM 200 GRAM(S): 250; 125 INJECTION, POWDER, FOR SUSPENSION INTRAMUSCULAR; INTRAVENOUS at 06:44

## 2018-06-12 NOTE — DISCHARGE NOTE ADULT - CARE PROVIDER_API CALL
Cory Hameed), Otolaryngology  [address]  [place] [address]  Phone: [phone]  Fax: [phone]

## 2018-06-12 NOTE — DISCHARGE NOTE ADULT - MEDICATION SUMMARY - MEDICATIONS TO TAKE
I will START or STAY ON the medications listed below when I get home from the hospital:    Medrol Dosepak 4 mg oral tablet  -- 1 tab(s) by mouth once a day. Please use as prescribed.   -- Indication: For Epiglottitis    aspirin 81 mg oral delayed release tablet  -- 1 tab(s) by mouth once a day  -- Indication: For TIA    Allegra Allergy  -- 180 milligram(s) by mouth 2 times a day, As Needed  -- Indication: For As needed     amoxicillin-clavulanate 875 mg-125 mg oral tablet  -- 1 tab(s) by mouth every 12 hours  -- Indication: For Epiglottitis

## 2018-06-12 NOTE — DISCHARGE NOTE ADULT - MEDICATION SUMMARY - MEDICATIONS TO STOP TAKING
I will STOP taking the medications listed below when I get home from the hospital:    aspirin 325 mg oral tablet

## 2018-06-12 NOTE — PROGRESS NOTE ADULT - SUBJECTIVE AND OBJECTIVE BOX
Patient is a 53y old  Female who presents with a chief complaint of Throat pain x 3 weeks got worse past 3 days (11 Jun 2018 04:40)      INTERVAL HPI/OVERNIGHT EVENTS: No new symptoms, complaints     MEDICATIONS  (STANDING):  ampicillin/sulbactam  IVPB 3 Gram(s) IV Intermittent every 6 hours  aspirin enteric coated 81 milliGRAM(s) Oral daily  dexamethasone  Injectable 4 milliGRAM(s) IV Push every 8 hours  enoxaparin Injectable 40 milliGRAM(s) SubCutaneous daily  loratadine 10 milliGRAM(s) Oral daily    MEDICATIONS  (PRN):      Allergies    No Known Allergies    Intolerances        REVIEW OF SYSTEMS:  All 10 systems reviewed and are negative except as above   Vital Signs Last 24 Hrs  T(C): 36.8 (12 Jun 2018 04:56), Max: 36.9 (11 Jun 2018 11:15)  T(F): 98.2 (12 Jun 2018 04:56), Max: 98.4 (11 Jun 2018 11:15)  HR: 70 (12 Jun 2018 04:56) (70 - 97)  BP: 105/62 (12 Jun 2018 04:56) (102/59 - 153/74)  BP(mean): 95 (11 Jun 2018 21:00) (74 - 106)  RR: 16 (12 Jun 2018 04:56) (15 - 30)  SpO2: 97% (12 Jun 2018 04:56) (92% - 97%)    PHYSICAL EXAM:  GENERAL: NAD, well-groomed, well-developed  HEAD:  Atraumatic, Normocephalic  EYES: EOMI, PERRLA, conjunctiva and sclera clear  ENMT: No tonsillar erythema, exudates, or enlargement; Moist mucous membranes, Good dentition, No lesions  NECK: Supple, No JVD, Normal thyroid  NERVOUS SYSTEM:  Alert & Oriented X3, Good concentration; Motor Strength 5/5 B/L upper and lower extremities; DTRs 2+ intact and symmetric  CHEST/LUNG: Clear to auscultation bilaterally; No rales, rhonchi, wheezing, or rubs  HEART: Regular rate and rhythm; No murmurs, rubs, or gallops  ABDOMEN: Soft, Nontender, Nondistended; Bowel sounds present  EXTREMITIES:  2+ Peripheral Pulses, No clubbing, cyanosis, or edema  LYMPH: No lymphadenopathy noted  SKIN: No rashes or lesions    LABS:                        13.0   15.66 )-----------( 299      ( 12 Jun 2018 07:46 )             38.5     12 Jun 2018 07:46    142    |  106    |  16     ----------------------------<  133    4.2     |  27     |  0.81     Ca    9.2        12 Jun 2018 07:46  Phos  4.1       12 Jun 2018 07:46  Mg     2.3       12 Jun 2018 07:46        CAPILLARY BLOOD GLUCOSE        BLOOD CULTURE  06-11 @ 08:45   No beta hemolytic streptococci or Arcanobacterium haemolyticum isolated.  --  --  06-11 @ 08:18   No growth to date.  --  --    RADIOLOGY & ADDITIONAL TESTS:    Imaging Personally Reviewed:  [ ] YES     Consultant(s) Notes Reviewed:      Care Discussed with Consultants/Other Providers:

## 2018-06-12 NOTE — CONSULT NOTE ADULT - PROBLEM SELECTOR RECOMMENDATION 9
blood cx negative  change to augmentin 875 mg bid to complete 10 days  steroids  and needs ENT follow up.

## 2018-06-12 NOTE — PROGRESS NOTE ADULT - ATTENDING COMMENTS
53F PMH TIA and Crohn's disease (in remission) presents with 3 weeks of sore throat and difficulty swallowing, initially improved with amoxicillin and prednisone, but then worsened with steroid taper. Now clinically much improved after dexamethasone, vancomycin, and ceftriaxone. CT neck showed evidence of epiglottitis or supraglottitis without airway compromise (no stridor on exam), but laryngoscopy showed no evidence of mass, edema, or infection.    - Continue Dexamethasone 4 IV q6h, can change to oral tomorrow  - Change antibiotics to Unasyn q6h, can discharge on augmentin tomorrow if stable  - Follow-up ENT  - MRI of neck for further evaluation, r/o retropharyngeal abscess, patient is s/p tonsillectomy  - No airway compromise, on room air with SpO2>95%  - Continue with aspirin for h/o TIA  - Soft diet as tolerated  - Stable kidney function and lytes  - DVT ppx  - No lines or dorantes
Taper Decadron  Cultures NTD

## 2018-06-12 NOTE — DISCHARGE NOTE ADULT - PLAN OF CARE
resolution Complete course of antibiotics and steroids as prescribed.   F/U with ENT within 3 to 5 days Continue baby aspirin  F/u with your doctor Continue as needed meds  f/u with your doctor.

## 2018-06-12 NOTE — CONSULT NOTE ADULT - SUBJECTIVE AND OBJECTIVE BOX
[facility], Division of Infectious Diseases  ANNALISA Bray A. Lee  [phone]  [address]  53y, Female  [id number]    HPI:  53 y.o. F w/ PMH of migraine and possible TIA (2015) presents to ED w/ difficulty swallowing and sore throat x 3 weeks.    Patient reports that after a trip to [place] she returned with a sore throat, cough and difficulty swallowing.    She had greenish discharge, and took a course of amoxicillin.    Afterwards she went to an urgent care and was placed on prednisone for 6 days, from which she felt minimal improvement for a day or two.  The day of admission patient reports 10/10 throat pain, with difficulty swallowing and pressure that radiated up into her ears.    Admitted for epiglottitis and placed on unasyn and steroids.  She is feeling much better.  No fevers, no chills    PMH/PSH--  Transient cerebral ischemia, unspecified transient cerebral ischemia type  Seasonal allergies  Migraine  Disorder of left rotator cuff  H/O eye surgery  History of tonsillectomy  History of appendectomy  H/O:     Allergies--nkda      Medications--  Antibiotics: ampicillin/sulbactam  IVPB 3 Gram(s) IV Intermittent every 6 hours    Immunologic:   Other: aspirin enteric coated  dexamethasone  Injectable  enoxaparin Injectable  loratadine      Social History--  EtOH: denies ***  Tobacco: denies ***  Drug Use: denies ***    Family/Marital History--  Family history of arrhythmia (Sibling)  HTTN      Remainder not relevant to clinical concern.    Travel/Environmental/Occupational History:    travel to [place]    Review of Systems:  A >=10-point review of systems was obtained.     Pertinent positives and negatives--  Constitutional: No fevers. No Chills. No Rigors.   Eyes: no blurry vision  ENMT: + dysphagia + sore throat  Cardiovascular: No chest pain. No palpitations.  Respiratory: No shortness of breath. No cough.  Gastrointestinal: No nausea or vomiting. No diarrhea or constipation.   Genitourinary: no dysuria  Musculoskeletal: no myalgia  Skin: no rash  Neurologic: No dizziness  Psychiatric: no depression    Review of systems otherwise negative except as previously noted.    Physical Exam--  Vital Signs: T(F): 98.2 (18 @ 04:56), Max: 98.4 (18 @ 11:15)  HR: 70 (18 @ 04:56)  BP: 105/62 (18 @ 04:56)  RR: 16 (18 @ 04:56)  SpO2: 97% (18 @ 04:56)  Wt(kg): --  General: Nontoxic-appearing Female in no acute distress.  HEENT: AT/NC. Conjunctiva pink and moist. Oropharynx clear. Dentition fair.  Neck: Not rigid. No sense of mass.  Nodes: None palpable.  Lungs: Clear bilaterally without rales, wheezing or rhonchi  Heart: Regular rate and rhythm.   Abdomen: Bowel sounds present and normoactive. Soft. Nondistended. Nontender.  Back: No spinal tenderness. No costovertebral angle tenderness.   Extremities: No cyanosis or clubbing. No edema.   Skin: Warm. Dry. Good turgor. No rash. No vasculitic stigmata.  Psychiatric: Appropriate affect and mood for situation.         Laboratory & Imaging Data--  CBC                        13.0   15.66 )-----------( 299      ( 2018 07:46 )             38.5       Chemistries  -    142  |  106  |  16  ----------------------------<  133<H>  4.2   |  27  |  0.81    Ca    9.2      2018 07:46  Phos  4.1     06-12  Mg     2.3     06-12    TPro  7.0  /  Alb  3.7  /  TBili  0.3  /  DBili  x   /  AST  10<L>  /  ALT  21  /  AlkPhos  75  06-11      Culture Data    Culture - Throat, Special (collected 2018 08:45)  Source: .Throat Throat  Preliminary Report (2018 09:27):    No beta hemolytic streptococci or Arcanobacterium haemolyticum isolated.    Culture - Blood (collected 2018 08:18)  Source: .Blood Blood  Preliminary Report (2018 09:01):    No growth to date.    Culture - Blood (collected 2018 08:18)  Source: .Blood Blood-Peripheral  Preliminary Report (2018 09:01):    No growth to date.    Culture - Blood (collected 2018 08:18)  Source: .Blood Blood-Peripheral  Preliminary Report (2018 09:01):    No growth to date.        < from: MR Neck Soft Tissue Only w/ IV Cont (18 @ 19:44) >  EXAM:  MR NECK SOFT TISSUE ONLY IC                            PROCEDURE DATE:  2018          INTERPRETATION:  [facility] RADIOLOGIST PRELIMINARY REPORT    EXAM:    MR Neck Without and With Intravenous Contrast    EXAM DATE/TIME:    2018 6:48 PM    CLINICAL HISTORY:    53 years old, female; Signs and symptoms; Mass, lump, or swelling;   Other:   Retropharygneal peritonsillar; Additional info: Faxed cat scan report    TECHNIQUE:    Multiplanar magnetic resonance images of the neck without and with   intravenous contrast.    CONTRAST:    7 mL of gadavist administered intravenously.    COMPARISON:    CT NECK SOFT TISSUE WITH IV CONTRAST 2018 02:04    FINDINGS:    Nasopharynx:  Unremarkable.    Oropharynx:  There is mild prominence ofthe lingual tonsils. This   finding is   more prominent on the left. The finding correlates with the asymmetric   density   at the same level on the CT scan.    Hypopharynx:  Unremarkable.    Larynx:  Unremarkable.  Normal epiglottis.    Retropharyngeal space:  Unremarkable.    Submandibular/parotid glands:  Unremarkable.  Glands are normal in size.    Thyroid:  Unremarkable.  No enlarged or calcified nodules.    Bones/joints:  Unremarkable.    Vasculature:  Unremarkable.    Lymph nodes:  There are a few mildly enlarged lymph nodes in the deep   spaces   of the neck. Enlarged nodes measure up to 1.6 cm.   The  IMPRESSION:         Mild nonspecific adenopathy including the lingual tonsils. The finding   correlates with the asymmetric density noted on the prior CT scan. No   other   acute changes in the deep soft tissue spaces of the neck.      < end of copied text >      Assessment--      Suggestions--        Michele Moraes MD  [phone]

## 2018-06-12 NOTE — DISCHARGE NOTE ADULT - PATIENT PORTAL LINK FT
You can access the [facility] Patient Portal, offered by [facility], by registering with the following website: [email or web address]

## 2018-06-12 NOTE — DISCHARGE NOTE ADULT - CARE PLAN
Principal Discharge DX:	Epiglottitis  Goal:	resolution  Assessment and plan of treatment:	Complete course of antibiotics and steroids as prescribed.   F/U with ENT within 3 to 5 days  Secondary Diagnosis:	Transient cerebral ischemia, unspecified transient cerebral ischemia type  Assessment and plan of treatment:	Continue baby aspirin  F/u with your doctor  Secondary Diagnosis:	Seasonal allergies  Assessment and plan of treatment:	Continue as needed meds  f/u with your doctor.

## 2018-06-12 NOTE — DISCHARGE NOTE ADULT - HOSPITAL COURSE
53 y.o. F w/ PMH of migraine and possible TIA (9/2015) presents to ED w/ difficulty swallowing and sore throat x 3 weeks; admitted w/ epiglottitis. Patient was started on IV steroids and antibiotics. Was seen by ENT and ID. MRI showed mild epiglottis. Symptoms improved. Cultures were negative. Patient to f/u with ENT as out patient. Also advised to f/u with her PCP within 3 to 5 days.

## 2018-06-13 LAB
CULTURE RESULTS: SIGNIFICANT CHANGE UP
SPECIMEN SOURCE: SIGNIFICANT CHANGE UP

## 2018-06-16 LAB
CULTURE RESULTS: SIGNIFICANT CHANGE UP
SPECIMEN SOURCE: SIGNIFICANT CHANGE UP

## 2018-07-17 ENCOUNTER — APPOINTMENT (OUTPATIENT)
Dept: ELECTROPHYSIOLOGY | Facility: CLINIC | Age: 53
End: 2018-07-17

## 2020-09-18 ENCOUNTER — APPOINTMENT (OUTPATIENT)
Dept: ELECTROPHYSIOLOGY | Facility: CLINIC | Age: 55
End: 2020-09-18
Payer: COMMERCIAL

## 2020-09-18 VITALS
HEIGHT: 61 IN | BODY MASS INDEX: 28.13 KG/M2 | HEART RATE: 77 BPM | SYSTOLIC BLOOD PRESSURE: 126 MMHG | DIASTOLIC BLOOD PRESSURE: 80 MMHG | OXYGEN SATURATION: 99 % | WEIGHT: 149 LBS

## 2020-09-18 DIAGNOSIS — R42 DIZZINESS AND GIDDINESS: ICD-10-CM

## 2020-09-18 DIAGNOSIS — G45.9 TRANSIENT CEREBRAL ISCHEMIC ATTACK, UNSPECIFIED: ICD-10-CM

## 2020-09-18 DIAGNOSIS — Z95.818 PRESENCE OF OTHER CARDIAC IMPLANTS AND GRAFTS: ICD-10-CM

## 2020-09-18 PROCEDURE — 99204 OFFICE O/P NEW MOD 45 MIN: CPT

## 2020-09-18 PROCEDURE — 93000 ELECTROCARDIOGRAM COMPLETE: CPT

## 2020-09-22 ENCOUNTER — NON-APPOINTMENT (OUTPATIENT)
Age: 55
End: 2020-09-22

## 2020-09-22 NOTE — HISTORY OF PRESENT ILLNESS
[FreeTextEntry1] : 55 year old woman w/PMHx of a cryptogenic stroke in september 2015, s/p ILR implant on 11/2/15.  At the time of her stroke, she presented with dizziness, headache, and visual deficits.  She received TPA, with resolution of her symptoms.  Review of her past implantable loop recorder transmissions revealed episodes of SVT up to 170s-180s with exercise.  Today, she overall feels well and denies any chest pain, palpitations, lightheadedness/dizziness, dyspnea, presyncope or syncope.  She presents to our office for discussion of ILR explant.  I am uncertain if she had a DELMY to evaluate for a PFO since her stroke was preceded by a left DVT. She also is heterozygous for Leiden Factor V.\par \par \par \par had symptoms \par severe pain behind left knee - thinks it was a blood clot, was sitting \par \par pfo?\par put on asa and now off\par father 's family with pacemaker, and extensive heart disease\par sister and two children have irregular heartbeat (afib)? \par older sister has afib - if patient herself did develop afib, afib + leiden V\par - can take ilr out and not necessarily need one in\par did a full blood workup for disorders - has factor V\par likely heterozygous\par 4 children, 1 miscarriage \par \par refer to mic? possible pfo closure \par can possibly see a pfo on echo\par - stress test \par - need echo results from [facility] [   ] said flow was in normal range\par important to know that a pfo is better seen on a DELMY than a TTE

## 2020-09-22 NOTE — REVIEW OF SYSTEMS
[Fever] : no fever [Headache] : no headache [Blurry Vision] : no blurred vision [Seeing Double (Diplopia)] : no diplopia [Earache] : no earache [Shortness Of Breath] : no shortness of breath [Dyspnea on exertion] : not dyspnea during exertion [Chest  Pressure] : no chest pressure [Chest Pain] : no chest pain [Lower Ext Edema] : no extremity edema [Palpitations] : no palpitations [Cough] : no cough [Wheezing] : no wheezing [Abdominal Pain] : no abdominal pain [Nausea] : no nausea [Joint Pain] : no joint pain [Joint Swelling] : no joint swelling [Skin: A Rash] : no rash: [Itching] : no itching [Dizziness] : no dizziness [Tremor] : no tremor was seen [Confusion] : no confusion was observed [Memory Lapses Or Loss] : no memory lapses or loss [Easy Bleeding] : no tendency for easy bleeding [Easy Bruising] : no tendency for easy bruising

## 2020-09-22 NOTE — PHYSICAL EXAM
[General Appearance - Well Developed] : well developed [Normal Appearance] : normal appearance [General Appearance - Well Nourished] : well nourished [Normal Conjunctiva] : the conjunctiva exhibited no abnormalities [Eyelids - No Xanthelasma] : the eyelids demonstrated no xanthelasmas [FreeTextEntry1] : NCAT [Normal Jugular Venous A Waves Present] : normal jugular venous A waves present [Normal Jugular Venous V Waves Present] : normal jugular venous V waves present [No Jugular Venous Martell A Waves] : no jugular venous martell A waves [Heart Sounds] : normal S1 and S2 [Murmurs] : no murmurs present [Arterial Pulses Normal] : the arterial pulses were normal [Edema] : no peripheral edema present [Veins - Varicosity Changes] : no varicosital changes were noted in the lower extremities [Auscultation Breath Sounds / Voice Sounds] : lungs were clear to auscultation bilaterally [Lungs Percussion] : the lungs were normal to percussion [Bowel Sounds] : normal bowel sounds [Abdomen Soft] : soft [Abdomen Tenderness] : non-tender [Abnormal Walk] : normal gait [Gait - Sufficient For Exercise Testing] : the gait was sufficient for exercise testing [Nail Clubbing] : no clubbing of the fingernails [Cyanosis, Localized] : no localized cyanosis [Petechial Hemorrhages (___cm)] : no petechial hemorrhages [] : no ischemic changes [Skin Color & Pigmentation] : normal skin color and pigmentation [No Venous Stasis] : no venous stasis [Oriented To Time, Place, And Person] : oriented to person, place, and time [Affect] : the affect was normal

## 2020-09-22 NOTE — REASON FOR VISIT
[Consultation] : a consultation regarding [FreeTextEntry2] : ILR explant [FreeTextEntry1] : \par Dr. Ritesh Li - cardiologist

## 2020-09-22 NOTE — DISCUSSION/SUMMARY
[FreeTextEntry1] : She had an ILR placed which is no longer functional and it is reasonable to remove it. No indication to replace. With a history of heterozygous Leiden V and possible DVT prior to stroke, we should explore whether she was worked up for a PFO.

## 2020-10-25 DIAGNOSIS — Z01.818 ENCOUNTER FOR OTHER PREPROCEDURAL EXAMINATION: ICD-10-CM

## 2020-10-26 ENCOUNTER — APPOINTMENT (OUTPATIENT)
Dept: DISASTER EMERGENCY | Facility: CLINIC | Age: 55
End: 2020-10-26

## 2020-10-27 ENCOUNTER — NON-APPOINTMENT (OUTPATIENT)
Age: 55
End: 2020-10-27

## 2020-10-27 LAB — SARS-COV-2 N GENE NPH QL NAA+PROBE: NOT DETECTED

## 2020-10-29 ENCOUNTER — OUTPATIENT (OUTPATIENT)
Dept: OUTPATIENT SERVICES | Facility: HOSPITAL | Age: 55
LOS: 1 days | End: 2020-10-29
Payer: COMMERCIAL

## 2020-10-29 ENCOUNTER — APPOINTMENT (OUTPATIENT)
Dept: CV DIAGNOSITCS | Facility: HOSPITAL | Age: 55
End: 2020-10-29

## 2020-10-29 DIAGNOSIS — I63.9 CEREBRAL INFARCTION, UNSPECIFIED: ICD-10-CM

## 2020-10-29 DIAGNOSIS — Z98.89 OTHER SPECIFIED POSTPROCEDURAL STATES: Chronic | ICD-10-CM

## 2020-10-29 DIAGNOSIS — Z98.890 OTHER SPECIFIED POSTPROCEDURAL STATES: Chronic | ICD-10-CM

## 2020-10-29 DIAGNOSIS — M67.912 UNSPECIFIED DISORDER OF SYNOVIUM AND TENDON, LEFT SHOULDER: Chronic | ICD-10-CM

## 2020-10-29 LAB
ALBUMIN SERPL ELPH-MCNC: 4.7 G/DL — SIGNIFICANT CHANGE UP (ref 3.3–5)
ALP SERPL-CCNC: 74 U/L — SIGNIFICANT CHANGE UP (ref 40–120)
ALT FLD-CCNC: 22 U/L — SIGNIFICANT CHANGE UP (ref 10–45)
ANION GAP SERPL CALC-SCNC: 10 MMOL/L — SIGNIFICANT CHANGE UP (ref 5–17)
AST SERPL-CCNC: 19 U/L — SIGNIFICANT CHANGE UP (ref 10–40)
BILIRUB SERPL-MCNC: 0.5 MG/DL — SIGNIFICANT CHANGE UP (ref 0.2–1.2)
BUN SERPL-MCNC: 18 MG/DL — SIGNIFICANT CHANGE UP (ref 7–23)
CALCIUM SERPL-MCNC: 10 MG/DL — SIGNIFICANT CHANGE UP (ref 8.4–10.5)
CHLORIDE SERPL-SCNC: 105 MMOL/L — SIGNIFICANT CHANGE UP (ref 96–108)
CO2 SERPL-SCNC: 28 MMOL/L — SIGNIFICANT CHANGE UP (ref 22–31)
CREAT SERPL-MCNC: 0.95 MG/DL — SIGNIFICANT CHANGE UP (ref 0.5–1.3)
GLUCOSE SERPL-MCNC: 95 MG/DL — SIGNIFICANT CHANGE UP (ref 70–99)
HCT VFR BLD CALC: 46.6 % — HIGH (ref 34.5–45)
HGB BLD-MCNC: 15.1 G/DL — SIGNIFICANT CHANGE UP (ref 11.5–15.5)
INR BLD: 0.94 RATIO — SIGNIFICANT CHANGE UP (ref 0.88–1.16)
MCHC RBC-ENTMCNC: 29.3 PG — SIGNIFICANT CHANGE UP (ref 27–34)
MCHC RBC-ENTMCNC: 32.4 GM/DL — SIGNIFICANT CHANGE UP (ref 32–36)
MCV RBC AUTO: 90.5 FL — SIGNIFICANT CHANGE UP (ref 80–100)
NRBC # BLD: 0 /100 WBCS — SIGNIFICANT CHANGE UP (ref 0–0)
PLATELET # BLD AUTO: 261 K/UL — SIGNIFICANT CHANGE UP (ref 150–400)
POTASSIUM SERPL-MCNC: 4.6 MMOL/L — SIGNIFICANT CHANGE UP (ref 3.5–5.3)
POTASSIUM SERPL-SCNC: 4.6 MMOL/L — SIGNIFICANT CHANGE UP (ref 3.5–5.3)
PROT SERPL-MCNC: 6.8 G/DL — SIGNIFICANT CHANGE UP (ref 6–8.3)
PROTHROM AB SERPL-ACNC: 11.3 SEC — SIGNIFICANT CHANGE UP (ref 10.6–13.6)
RBC # BLD: 5.15 M/UL — SIGNIFICANT CHANGE UP (ref 3.8–5.2)
RBC # FLD: 12 % — SIGNIFICANT CHANGE UP (ref 10.3–14.5)
SODIUM SERPL-SCNC: 143 MMOL/L — SIGNIFICANT CHANGE UP (ref 135–145)
WBC # BLD: 4.71 K/UL — SIGNIFICANT CHANGE UP (ref 3.8–10.5)
WBC # FLD AUTO: 4.71 K/UL — SIGNIFICANT CHANGE UP (ref 3.8–10.5)

## 2020-10-29 PROCEDURE — 33286 RMVL SUBQ CAR RHYTHM MNTR: CPT

## 2020-10-29 PROCEDURE — 93312 ECHO TRANSESOPHAGEAL: CPT | Mod: 26

## 2020-10-29 PROCEDURE — 93306 TTE W/DOPPLER COMPLETE: CPT | Mod: 26

## 2020-10-29 PROCEDURE — 85027 COMPLETE CBC AUTOMATED: CPT

## 2020-10-29 PROCEDURE — 93312 ECHO TRANSESOPHAGEAL: CPT

## 2020-10-29 PROCEDURE — 93306 TTE W/DOPPLER COMPLETE: CPT

## 2020-10-29 PROCEDURE — 80053 COMPREHEN METABOLIC PANEL: CPT

## 2020-10-29 PROCEDURE — 85610 PROTHROMBIN TIME: CPT

## 2020-10-29 RX ORDER — MONTELUKAST 4 MG/1
1 TABLET, CHEWABLE ORAL
Qty: 0 | Refills: 0 | DISCHARGE

## 2020-10-29 RX ORDER — ATORVASTATIN CALCIUM 80 MG/1
1 TABLET, FILM COATED ORAL
Qty: 0 | Refills: 0 | DISCHARGE

## 2020-10-29 NOTE — H&P CARDIOLOGY - PSH
Disorder of left rotator cuff    H/O eye surgery    H/O:   x 4  History of appendectomy    History of loop recorder  imlplant in 2015  History of tonsillectomy

## 2020-10-29 NOTE — H&P CARDIOLOGY - HISTORY OF PRESENT ILLNESS
This is a 55 year old female w/PMHx of a cryptogenic stroke in september 2015, s/p ILR implant on 11/2/15 and heterozygous for Leiden Factor V. At the time of her stroke, she presented with dizziness, headache, and visual deficits. She received TPA, with resolution of her symptoms. Review of her past implantable loop recorder transmissions revealed episodes of SVT up to 170s-180s with exercise.  Patient verbalizes to overall feel well and denies any chest pain, palpitations, lightheadedness/dizziness, dyspnea, presyncope or syncope. She presents here today for ILR explant and DELMY to evaluate for a PFO since her stroke was preceded by a left DVT.       cards Dr Li              This is a 55 year old  female w/PMHx of a cryptogenic stroke in september 2015 ( with no residuals) , s/p ILR implant on 11/2/15 and heterozygous for Leiden Factor V. At the time of her stroke, she presented with dizziness, headache, and visual deficits. She received TPA, with resolution of her symptoms. Review of her past implantable loop recorder transmissions revealed episodes of SVT up to 170s-180s with exercise.  Patient verbalizes to overall feel well and denies any chest pain, palpitations, lightheadedness/dizziness, dyspnea, presyncope or syncope. She presents here today for ILR explant and DELMY to evaluate for a PFO since her stroke was preceded by a left DVT.       cards Dr Li

## 2020-10-29 NOTE — CHART NOTE - NSCHARTNOTEFT_GEN_A_CORE
· Note Type	ILR removal      Procedure Performed	 ILR Removal (96283)  • TIME OUT	Patient's first and last name, , procedure, and correct site confirmed prior to the start of procedure.  • Practitioner performing the TIME OUT	[facility]    • Procedure Date/Time	10/29/2020  08:00 am    • Informed Consent	Benefits, risks, and possible complications of procedure explained to patient/caregiver who verbalized understanding and gave written consent.  • Procedure Performed By	[facility]    • Referring Providers	Ritesh Li MD    • Indications                        Linq battery depletion placed for syncope in 2018  •Preprocedure Antibiotic 	none  • Site Prep	              Chlorhexidine    • Anatomic Location	Left 4th ICS  • Patient Position	              supine  • Procedural Sedation Used	No  • Local Anesthesia	              2% lidocaine; with EPI    • General Procedure Details	The chest wall was prepped and draped. Local anesthetic (10 cc) was infiltrated subcutaneously at the 4th left ICS and along a 45 degree inferiorly directed angle along the ILR. A nick was made with an 11-blade along the previous insertion scar. The Linq was retrieved using a small hemostat. The skin was closed with 2 staples. Excellent hemostasis obtained. A dressing was applied.  • Complications	              None    • Estimated Blood Loss	None    • Post-Procedure Care Guidelines	Verbal/written post procedure instructions were given to patient/caregiver  • Additional Procedure Details	Device Removed Medtronic Linq [id number], serial [id number] implanted 2015

## 2020-10-29 NOTE — H&P CARDIOLOGY - PMH
Cryptogenic stroke    DVT (deep venous thrombosis)    Factor V Leiden    Migraine    Seasonal allergies    SVT (supraventricular tachycardia)    Transient cerebral ischemia, unspecified transient cerebral ischemia type

## 2020-10-30 PROBLEM — I63.9 CEREBRAL INFARCTION, UNSPECIFIED: Chronic | Status: ACTIVE | Noted: 2020-10-29

## 2020-10-30 PROBLEM — D68.51 ACTIVATED PROTEIN C RESISTANCE: Chronic | Status: ACTIVE | Noted: 2020-10-29

## 2020-10-30 PROBLEM — I82.409 ACUTE EMBOLISM AND THROMBOSIS OF UNSPECIFIED DEEP VEINS OF UNSPECIFIED LOWER EXTREMITY: Chronic | Status: ACTIVE | Noted: 2020-10-29

## 2020-10-30 PROBLEM — I47.1 SUPRAVENTRICULAR TACHYCARDIA: Chronic | Status: ACTIVE | Noted: 2020-10-29

## 2020-11-04 ENCOUNTER — APPOINTMENT (OUTPATIENT)
Dept: ELECTROPHYSIOLOGY | Facility: CLINIC | Age: 55
End: 2020-11-04
Payer: COMMERCIAL

## 2020-11-04 VITALS
BODY MASS INDEX: 28.13 KG/M2 | WEIGHT: 149 LBS | OXYGEN SATURATION: 99 % | SYSTOLIC BLOOD PRESSURE: 123 MMHG | HEIGHT: 61 IN | DIASTOLIC BLOOD PRESSURE: 81 MMHG | HEART RATE: 76 BPM

## 2020-11-04 PROCEDURE — 99072 ADDL SUPL MATRL&STAF TM PHE: CPT

## 2020-11-04 PROCEDURE — 99212 OFFICE O/P EST SF 10 MIN: CPT

## 2020-11-04 NOTE — HISTORY OF PRESENT ILLNESS
[FreeTextEntry1] : 55 year old woman w/PMHx of a cryptogenic stroke in september 2015, s/p ILR implant on 11/2/15. She had no events of Afib and recently underwent loop explant on 10/29. She presents today for wound evaluation. Her site on the L sternal border is healing well w/o any erythema, swelling and drainage. Staples are intact, which is removed.

## 2020-11-04 NOTE — DISCUSSION/SUMMARY
[FreeTextEntry1] : The incision is healing well. Staples removed and she will continue to f/u with Dr. Li.

## 2020-12-25 ENCOUNTER — EMERGENCY (EMERGENCY)
Facility: HOSPITAL | Age: 55
LOS: 1 days | Discharge: HOME | End: 2020-12-25
Attending: EMERGENCY MEDICINE | Admitting: EMERGENCY MEDICINE
Payer: COMMERCIAL

## 2020-12-25 VITALS
HEART RATE: 76 BPM | SYSTOLIC BLOOD PRESSURE: 128 MMHG | OXYGEN SATURATION: 97 % | RESPIRATION RATE: 18 BRPM | DIASTOLIC BLOOD PRESSURE: 83 MMHG | HEIGHT: 61 IN | TEMPERATURE: 98 F | WEIGHT: 147.93 LBS

## 2020-12-25 VITALS
OXYGEN SATURATION: 99 % | RESPIRATION RATE: 18 BRPM | SYSTOLIC BLOOD PRESSURE: 108 MMHG | DIASTOLIC BLOOD PRESSURE: 64 MMHG | TEMPERATURE: 98 F | HEART RATE: 88 BPM

## 2020-12-25 DIAGNOSIS — M67.912 UNSPECIFIED DISORDER OF SYNOVIUM AND TENDON, LEFT SHOULDER: Chronic | ICD-10-CM

## 2020-12-25 DIAGNOSIS — Z98.89 OTHER SPECIFIED POSTPROCEDURAL STATES: Chronic | ICD-10-CM

## 2020-12-25 DIAGNOSIS — Z98.890 OTHER SPECIFIED POSTPROCEDURAL STATES: Chronic | ICD-10-CM

## 2020-12-25 PROCEDURE — 96374 THER/PROPH/DIAG INJ IV PUSH: CPT

## 2020-12-25 PROCEDURE — 99284 EMERGENCY DEPT VISIT MOD MDM: CPT | Mod: 25

## 2020-12-25 RX ORDER — CEFAZOLIN SODIUM 1 G
1000 VIAL (EA) INJECTION ONCE
Refills: 0 | Status: COMPLETED | OUTPATIENT
Start: 2020-12-25 | End: 2020-12-25

## 2020-12-25 RX ADMIN — Medication 100 MILLIGRAM(S): at 19:19

## 2020-12-25 NOTE — PROGRESS NOTE ADULT - SUBJECTIVE AND OBJECTIVE BOX
Progress Note Adult-Plastic Surgery Attending [Charted Location: [facility] ED] [Authored: 25-Dec-2020 21:42]- for Visit: [id number], Complete, Entered, Signed in Full, General    Progress Note:   · Provider Specialty	Plastic Surgery    Reason for Admission:    Reason for Admission:  · Reason for Admission	laceration of left index finger    Telehealth:    Telehealth:  · Telehealth?	No      · Subjective and Objective:   S  55 year old RHD  female who incurred an avulsion of her left index finger adjacent to her fingernail today while using a cutting knife  c/o pain and bleeding    NKDA    Pt was given a dose of IV abx while in ER    O 1cm x.3mm skin avulsion/ skin loss of the ulnar aspect of her left index finger 2mm from the nailbed      Very superficial with active bleeding    Prepped and draped with betadine  Digital block of index finger carried out with 1% lidocaine with epinephrine  Wound exploration  Cauterization of wound base    Dressed with bacitracin, bandaid and 2" amado    A & P Pt with avulsion of ulnar aspect of distal index finger-very superficial-with skin loss           Wound will heal by secondary intention           Instructions given           PO abx           F/U in office next week        Electronic Signatures:  Krisetn Mane)  (Signed 25-Dec-2020 21:52)  	Authored: Progress Note, Reason for Admission, Telehealth, Subjective and Objective      Last Updated: 25-Dec-2020 21:52 by Kristen Mane)

## 2020-12-25 NOTE — ED PROVIDER NOTE - OBJECTIVE STATEMENT
pt c/o laceration/avulsion to left index finger s/p cut by knife while slicing potatos. no other injury. tetanus utd.

## 2020-12-25 NOTE — ED PROVIDER NOTE - SKIN, MLM
Skin normal color for race, warm, dry. left indx finger approx 1cm avulsion lateral to nail distal sensation intact, cap refill < 2 secs

## 2020-12-25 NOTE — ED ADULT NURSE REASSESSMENT NOTE - NS ED NURSE REASSESS COMMENT FT1
Pt is Aox4 has a laceration on L pointer finger wrapped and bleeding is controlled. VSS antibiotic is running, will reassess.

## 2020-12-25 NOTE — ED PROVIDER NOTE - PATIENT PORTAL LINK FT
You can access the FollowMyHealth Patient Portal offered by [facility] by registering at the following website: [email or web address]. By joining [employer]’s FollowMyHealth portal, you will also be able to view your health information using other applications (apps) compatible with our system.

## 2020-12-25 NOTE — ED PROVIDER NOTE - CARE PROVIDER_API CALL
Kristen Del Rosario)  Plastic Surgery  [address] [place] [address]  Phone: [phone]  Fax: [phone]  Follow Up Time: 1-3 Days

## 2021-08-23 ENCOUNTER — APPOINTMENT (OUTPATIENT)
Dept: MAMMOGRAPHY | Facility: CLINIC | Age: 56
End: 2021-08-23

## 2021-08-23 ENCOUNTER — APPOINTMENT (OUTPATIENT)
Dept: RADIOLOGY | Facility: CLINIC | Age: 56
End: 2021-08-23

## 2021-12-01 ENCOUNTER — APPOINTMENT (OUTPATIENT)
Dept: MAMMOGRAPHY | Facility: CLINIC | Age: 56
End: 2021-12-01

## 2021-12-01 ENCOUNTER — APPOINTMENT (OUTPATIENT)
Dept: RADIOLOGY | Facility: CLINIC | Age: 56
End: 2021-12-01

## 2022-02-01 ENCOUNTER — APPOINTMENT (OUTPATIENT)
Dept: OTOLARYNGOLOGY | Facility: CLINIC | Age: 57
End: 2022-02-01
Payer: COMMERCIAL

## 2022-02-01 VITALS
SYSTOLIC BLOOD PRESSURE: 116 MMHG | HEIGHT: 61 IN | DIASTOLIC BLOOD PRESSURE: 75 MMHG | BODY MASS INDEX: 27.19 KG/M2 | HEART RATE: 73 BPM | WEIGHT: 144 LBS

## 2022-02-01 DIAGNOSIS — S09.92XA UNSPECIFIED INJURY OF NOSE, INITIAL ENCOUNTER: ICD-10-CM

## 2022-02-01 DIAGNOSIS — R09.81 NASAL CONGESTION: ICD-10-CM

## 2022-02-01 DIAGNOSIS — J38.4 EDEMA OF LARYNX: ICD-10-CM

## 2022-02-01 PROCEDURE — 99204 OFFICE O/P NEW MOD 45 MIN: CPT | Mod: 25

## 2022-02-01 PROCEDURE — 31231 NASAL ENDOSCOPY DX: CPT

## 2022-02-01 RX ORDER — METHYLPREDNISOLONE 4 MG/1
4 TABLET ORAL
Qty: 1 | Refills: 0 | Status: ACTIVE | COMMUNITY
Start: 2022-02-01 | End: 1900-01-01

## 2022-02-01 NOTE — HISTORY OF PRESENT ILLNESS
[de-identified] : Nola Meléndez is a 57 yo female who presents today for evaluation of facial trauma. She states that she slipped in the back seat of her car this morning and struck her nose. She then noticed a bump on the side of her nose. She denies any epistaxis or nasal obstruction. She has chronic nasal congestion but no change after the trauma. She denies loss of consciousness. She denies vision changes or pain/restriction of extraocular movements. She states that her bite feels normal. She denies any facial weakness or numbness.\par \par She has history of supraglottitis. She was previously on antireflux medication but is no longer taking it. She notes increased globus sensation and "clicking" sensation. She states this is because she has had more caffeine recently. She notes increased hoarseness. She denies heartburn, dysphagia, odynophagia. She denies shortness of breath or noisy breathing. She denies fevers or chills.

## 2022-02-01 NOTE — REVIEW OF SYSTEMS
[Sneezing] : sneezing [Seasonal Allergies] : seasonal allergies [Post Nasal Drip] : post nasal drip [Sinus Pain] : sinus pain [Sinus Pressure] : sinus pressure [Discolored Nasal Discharge] : discolored nasal discharge [Hoarseness] : hoarseness [Eyes Itch] : itching of the eyes [Negative] : Heme/Lymph

## 2022-02-01 NOTE — PHYSICAL EXAM
[Nasal Endoscopy Performed] : nasal endoscopy was performed, see procedure section for findings [de-identified] : Mild swelling of nasal dorsum. Straight dorsum, no palpable stepoffs. Small bump on nasal dorsum. No septal hematoma. [Midline] : trachea located in midline position [Laryngoscopy Performed] : laryngoscopy was performed, see procedure section for findings [Normal] : no rashes

## 2022-02-01 NOTE — ASSESSMENT
[FreeTextEntry1] : Nola Meléndez presents for evaluation of nasal trauma. She has mild swelling of nasal dorsum with a small bump, but no deviation or palpable stepoff. Endoscopy did not show a septal hematoma. We will obtain CT to evaluate for nasal fracture or other concomitant facial fractures.\par \par  In addition, she has a history of supraglottitis. She notes increased hoarseness and globus sensatoin recently. She is not having dysphagia or dyspnea and she states that this is nowhere near the degree of her symptoms during her supraglottitis episode. Endoscopy shows signfiicant postcricoid and arytenoid inflammation, but airway is patent and visible. Will start steroids and antireflux medication.\par \par - Medrol dose pack. The potential side effects of high-dose steroid use were discussed at length. These risks include but are not limited to: increased appetite, insomnia, fluid retention, mood swings, weight gain, change in blood pressure, high blood glucose, possible adrenal suppression, osteoporosis, avascular necrosis of the hip, menstrual irregularities (if applicable), and cataracts\par - Omeprazole 20 mg qd. Risks of omeprazole were discussed including but not limited to GI distress, osteoporosis, pneumonia.\par - Antireflux lifestyle and dietary modificatoins discussed.\par - CT maxillofacial - will call patient with result.\par - Follow up in 2 weeks. She knows to present to ED if she has any difficulty breathing or swallowing.

## 2022-02-02 ENCOUNTER — APPOINTMENT (OUTPATIENT)
Dept: CT IMAGING | Facility: CLINIC | Age: 57
End: 2022-02-02
Payer: COMMERCIAL

## 2022-02-02 ENCOUNTER — OUTPATIENT (OUTPATIENT)
Dept: OUTPATIENT SERVICES | Facility: HOSPITAL | Age: 57
LOS: 1 days | End: 2022-02-02
Payer: COMMERCIAL

## 2022-02-02 DIAGNOSIS — Z98.890 OTHER SPECIFIED POSTPROCEDURAL STATES: Chronic | ICD-10-CM

## 2022-02-02 DIAGNOSIS — Z98.89 OTHER SPECIFIED POSTPROCEDURAL STATES: Chronic | ICD-10-CM

## 2022-02-02 DIAGNOSIS — M67.912 UNSPECIFIED DISORDER OF SYNOVIUM AND TENDON, LEFT SHOULDER: Chronic | ICD-10-CM

## 2022-02-02 DIAGNOSIS — S09.92XA UNSPECIFIED INJURY OF NOSE, INITIAL ENCOUNTER: ICD-10-CM

## 2022-02-02 PROCEDURE — 70486 CT MAXILLOFACIAL W/O DYE: CPT

## 2022-02-02 PROCEDURE — 70486 CT MAXILLOFACIAL W/O DYE: CPT | Mod: 26

## 2022-02-03 ENCOUNTER — NON-APPOINTMENT (OUTPATIENT)
Age: 57
End: 2022-02-03

## 2022-02-09 ENCOUNTER — APPOINTMENT (OUTPATIENT)
Dept: OTOLARYNGOLOGY | Facility: CLINIC | Age: 57
End: 2022-02-09
Payer: COMMERCIAL

## 2022-02-09 VITALS
DIASTOLIC BLOOD PRESSURE: 71 MMHG | SYSTOLIC BLOOD PRESSURE: 126 MMHG | HEART RATE: 76 BPM | HEIGHT: 61 IN | BODY MASS INDEX: 27.19 KG/M2 | WEIGHT: 144 LBS

## 2022-02-09 DIAGNOSIS — J31.0 CHRONIC RHINITIS: ICD-10-CM

## 2022-02-09 PROCEDURE — 31575 DIAGNOSTIC LARYNGOSCOPY: CPT

## 2022-02-09 PROCEDURE — 99213 OFFICE O/P EST LOW 20 MIN: CPT | Mod: 25

## 2022-02-09 RX ORDER — AMOXICILLIN AND CLAVULANATE POTASSIUM 875; 125 MG/1; MG/1
875-125 TABLET, COATED ORAL
Qty: 20 | Refills: 0 | Status: ACTIVE | COMMUNITY
Start: 2022-02-09 | End: 1900-01-01

## 2022-02-09 NOTE — ASSESSMENT
[FreeTextEntry1] : Nola Meléndez presents for follow-up. Her previously ordered CT maxillofacial did not show evidence of a nasal fracture. She was treated for supraglottic edema with medrol dose pack and was started on omeprazole. Her supraglottic edema has improved on flexible laryngoscopy. However, her cough has worsened and she is now having green secretions. On sinonasal endoscopy, she has thin secretions bilaterally and has septal deviation to right. It is unclear if her secretions are due to postnasal drainage, possibly secondary to rhinitis versus sinusitis, or if she is having bronchitis. Will treat with antibiotics and topical regimen.\par \par - Start augmentin x 10 days. Side effects were discussed and include but are not limited to nausea, vomiting, diarrhea, and skin rash.\par - Start nasal saline sprays TID\par - Start flonase 2 sprays BID\par - Continue omeprazole\par - She will call if her cough worsens or if she develops shortness of breath, and at that time, we will obtain a CXR.\par - Follow up in 2 weeks.

## 2022-02-09 NOTE — HISTORY OF PRESENT ILLNESS
[de-identified] : Nola Meléndez is a 57 yo female who presents today for evaluation of facial trauma. She states that she slipped in the back seat of her car this morning and struck her nose. She then noticed a bump on the side of her nose. She denies any epistaxis or nasal obstruction. She has chronic nasal congestion but no change after the trauma. She denies loss of consciousness. She denies vision changes or pain/restriction of extraocular movements. She states that her bite feels normal. She denies any facial weakness or numbness.\par \par She has history of supraglottitis. She was previously on antireflux medication but is no longer taking it. She notes increased globus sensation and "clicking" sensation. She states this is because she has had more caffeine recently. She notes increased hoarseness. She denies heartburn, dysphagia, odynophagia. She denies shortness of breath or noisy breathing. She denies fevers or chills. [FreeTextEntry1] : 2/9/22 - Patient presents for follow-up. She had been previously contacted for CT maxillofacial which did not show evidence of facial fracture. She completed her steroid course for supraglottic edema. She is currently taking omeprazole. She states that she has increased cough in the morning and now has greenish discharge. She states that her voice is worse. She denies heartburn, dysphagia, or odynophagia. She denies shortness of breath but has increased cough. She denies fevers or chills. She denies nasal congestion, postnasal drainage, rhinorrhea, or sinus pressure at this time.

## 2022-02-09 NOTE — PHYSICAL EXAM
[Nasal Endoscopy Performed] : nasal endoscopy was performed, see procedure section for findings [] : septum deviated to the right [Midline] : trachea located in midline position [Laryngoscopy Performed] : laryngoscopy was performed, see procedure section for findings [Normal] : no rashes

## 2022-02-09 NOTE — DATA REVIEWED
[de-identified] : CT maxillofacial\par FINDINGS:\par \par SOFT TISSUES: Within normal limits.\par MAXILLOFACIAL BONES: No acute fracture.\par TEMPOROMANDIBULAR JOINTS: Intact.\par TYMPANOMASTOID CAVITIES: Within normal limits.\par PARANASAL SINUSES: Tiny mucous retention cysts versus polyps in the bilateral maxillary sinuses. Trace posterior left ethmoid mucosal thickening.\par DENTITION: Within normal limits.\par ORBITS: Within normal limits.\par VESSELS: Within normal limits.\par \par IMAGED PORTIONS:\par BRAIN: Within normal limits.\par CERVICAL SPINE: Degenerative changes.\par \par IMPRESSION:\par \par No acute maxillofacial bone fracture.\par

## 2022-02-16 ENCOUNTER — APPOINTMENT (OUTPATIENT)
Dept: OTOLARYNGOLOGY | Facility: CLINIC | Age: 57
End: 2022-02-16

## 2022-02-24 ENCOUNTER — APPOINTMENT (OUTPATIENT)
Dept: OTOLARYNGOLOGY | Facility: CLINIC | Age: 57
End: 2022-02-24

## 2022-03-03 ENCOUNTER — APPOINTMENT (OUTPATIENT)
Dept: OTOLARYNGOLOGY | Facility: CLINIC | Age: 57
End: 2022-03-03
Payer: COMMERCIAL

## 2022-03-03 VITALS
SYSTOLIC BLOOD PRESSURE: 118 MMHG | HEART RATE: 73 BPM | BODY MASS INDEX: 26.81 KG/M2 | DIASTOLIC BLOOD PRESSURE: 64 MMHG | WEIGHT: 142 LBS | HEIGHT: 61 IN

## 2022-03-03 DIAGNOSIS — R05.3 CHRONIC COUGH: ICD-10-CM

## 2022-03-03 PROCEDURE — 99213 OFFICE O/P EST LOW 20 MIN: CPT

## 2022-03-03 RX ORDER — AMOXICILLIN AND CLAVULANATE POTASSIUM 875; 125 MG/1; MG/1
875-125 TABLET, COATED ORAL
Qty: 20 | Refills: 0 | Status: ACTIVE | COMMUNITY
Start: 2022-03-03 | End: 1900-01-01

## 2022-03-03 NOTE — HISTORY OF PRESENT ILLNESS
[de-identified] : Nola Meléndez is a 55 yo female who presents today for evaluation of facial trauma. She states that she slipped in the back seat of her car this morning and struck her nose. She then noticed a bump on the side of her nose. She denies any epistaxis or nasal obstruction. She has chronic nasal congestion but no change after the trauma. She denies loss of consciousness. She denies vision changes or pain/restriction of extraocular movements. She states that her bite feels normal. She denies any facial weakness or numbness.\par \par She has history of supraglottitis. She was previously on antireflux medication but is no longer taking it. She notes increased globus sensation and "clicking" sensation. She states this is because she has had more caffeine recently. She notes increased hoarseness. She denies heartburn, dysphagia, odynophagia. She denies shortness of breath or noisy breathing. She denies fevers or chills. [FreeTextEntry1] : 2/9/22 - Patient presents for follow-up. She had been previously contacted for CT maxillofacial which did not show evidence of facial fracture. She completed her steroid course for supraglottic edema. She is currently taking omeprazole. She states that she has increased cough in the morning and now has greenish discharge. She states that her voice is worse. She denies heartburn, dysphagia, or odynophagia. She denies shortness of breath but has increased cough. She denies fevers or chills. She denies nasal congestion, postnasal drainage, rhinorrhea, or sinus pressure at this time.\par \par 3/3/22 - Patient presents for follow-up. She completed her course of augmentin and has been on omeprazole. She did not use any nasal saline sprays or flonase. She felt that her cough improved but after this, her symptoms returned while in [place]. She has increased cough with some greenish discharge. She continues to have some globus sensation. She notes dysphonia by the end of the day. She denies dyspnea, dysphagia or odynophagia. She denies heartburn or food regurgitation. She denies nasal congestion, rhinorrhea, postnasal drainage, or sinus pressure. She had low grade fever last week. She went to urgent care where she was prescribed steroids. She did not take this.

## 2022-03-03 NOTE — ASSESSMENT
[FreeTextEntry1] : Nola Meléndez presents for follow-up. She had initial improvement of her symptoms after antibiotic treatment but her symptoms have recurred. She is having cough with some greenish sputum as well as intermittent dysphonia. She has been on omeprazole for laryngopharyngeal reflux. Given that she had some improvement after antibiotics, will treat this upper respiratory tract infection with antibiotics again. We discussed that if her symptoms persist, we can evaluate for pulmonary etiology.\par \par - Augmentin x 10 days. Side effects were discussed and include but are not limited to nausea, vomiting, diarrhea, and skin rash.\par - Continue omeprazole.\par - Nasal saline sprays TID\par - Flonase BID\par - Follow up in 2 weeks.\par

## 2022-03-03 NOTE — PHYSICAL EXAM
[] : septum deviated to the right [Midline] : trachea located in midline position [Normal] : no rashes [Nasal Endoscopy Performed] : nasal endoscopy was performed, see procedure section for findings [Laryngoscopy Performed] : laryngoscopy was performed, see procedure section for findings

## 2022-03-25 ENCOUNTER — APPOINTMENT (OUTPATIENT)
Dept: OTOLARYNGOLOGY | Facility: CLINIC | Age: 57
End: 2022-03-25
Payer: COMMERCIAL

## 2022-03-25 VITALS
SYSTOLIC BLOOD PRESSURE: 121 MMHG | DIASTOLIC BLOOD PRESSURE: 80 MMHG | BODY MASS INDEX: 26.81 KG/M2 | HEIGHT: 61 IN | WEIGHT: 142 LBS | HEART RATE: 69 BPM

## 2022-03-25 DIAGNOSIS — R19.8 OTHER SPECIFIED SYMPTOMS AND SIGNS INVOLVING THE DIGESTIVE SYSTEM AND ABDOMEN: ICD-10-CM

## 2022-03-25 DIAGNOSIS — K21.9 GASTRO-ESOPHAGEAL REFLUX DISEASE W/OUT ESOPHAGITIS: ICD-10-CM

## 2022-03-25 PROCEDURE — 99213 OFFICE O/P EST LOW 20 MIN: CPT | Mod: 25

## 2022-03-25 PROCEDURE — 31575 DIAGNOSTIC LARYNGOSCOPY: CPT

## 2022-03-25 NOTE — HISTORY OF PRESENT ILLNESS
[de-identified] : Nola Meléndez is a 57 yo female who presents today for evaluation of facial trauma. She states that she slipped in the back seat of her car this morning and struck her nose. She then noticed a bump on the side of her nose. She denies any epistaxis or nasal obstruction. She has chronic nasal congestion but no change after the trauma. She denies loss of consciousness. She denies vision changes or pain/restriction of extraocular movements. She states that her bite feels normal. She denies any facial weakness or numbness.\par \par She has history of supraglottitis. She was previously on antireflux medication but is no longer taking it. She notes increased globus sensation and "clicking" sensation. She states this is because she has had more caffeine recently. She notes increased hoarseness. She denies heartburn, dysphagia, odynophagia. She denies shortness of breath or noisy breathing. She denies fevers or chills. [FreeTextEntry1] : 2/9/22 - Patient presents for follow-up. She had been previously contacted for CT maxillofacial which did not show evidence of facial fracture. She completed her steroid course for supraglottic edema. She is currently taking omeprazole. She states that she has increased cough in the morning and now has greenish discharge. She states that her voice is worse. She denies heartburn, dysphagia, or odynophagia. She denies shortness of breath but has increased cough. She denies fevers or chills. She denies nasal congestion, postnasal drainage, rhinorrhea, or sinus pressure at this time.\par \par 3/3/22 - Patient presents for follow-up. She completed her course of augmentin and has been on omeprazole. She did not use any nasal saline sprays or flonase. She felt that her cough improved but after this, her symptoms returned while in [place]. She has increased cough with some greenish discharge. She continues to have some globus sensation. She notes dysphonia by the end of the day. She denies dyspnea, dysphagia or odynophagia. She denies heartburn or food regurgitation. She denies nasal congestion, rhinorrhea, postnasal drainage, or sinus pressure. She had low grade fever last week. She went to urgent care where she was prescribed steroids. She did not take this.\par \par 3/25/22 - Ms. Meléndez presents for follow-up. She notes that her cough has improved after antibiotics. She denies dysphagia, odynophagia, or dyspnea. She denies dysphonia. She states that she continues to have globus sensation. She recently had increased reflux secondary to food intake. She is taking her omeprazole.

## 2022-03-25 NOTE — ASSESSMENT
[FreeTextEntry1] : Nola Meléndez presents for follow-up. Her cough has resolved after antibiotic course. She continues to have mild globus sensation and on laryngoscopy, she has postcricoid inflammation secondary to laryngopharyngeal reflux. She notes not adhering to her diet recently which may have led to an exacerbation. She is on omeprazole.\par \par - Continue antireflux lifestyle and dietary modifications.\par - Continue omeprazole.\par - Patient is seeing her GI physician soon who can add medication to her regimen.\par - Follow up in 3 months.

## 2022-04-29 ENCOUNTER — RX RENEWAL (OUTPATIENT)
Age: 57
End: 2022-04-29

## 2022-04-29 RX ORDER — OMEPRAZOLE 20 MG/1
20 CAPSULE, DELAYED RELEASE ORAL
Qty: 90 | Refills: 0 | Status: ACTIVE | COMMUNITY
Start: 2022-02-01 | End: 1900-01-01

## 2022-06-13 ENCOUNTER — APPOINTMENT (OUTPATIENT)
Dept: OTOLARYNGOLOGY | Facility: CLINIC | Age: 57
End: 2022-06-13

## 2022-10-26 ENCOUNTER — APPOINTMENT (OUTPATIENT)
Dept: MAMMOGRAPHY | Facility: CLINIC | Age: 57
End: 2022-10-26

## 2022-10-26 ENCOUNTER — APPOINTMENT (OUTPATIENT)
Dept: RADIOLOGY | Facility: CLINIC | Age: 57
End: 2022-10-26

## 2022-10-26 PROCEDURE — 77063 BREAST TOMOSYNTHESIS BI: CPT

## 2022-10-26 PROCEDURE — 77080 DXA BONE DENSITY AXIAL: CPT

## 2022-10-26 PROCEDURE — 77067 SCR MAMMO BI INCL CAD: CPT

## 2023-02-23 NOTE — ED ADULT NURSE REASSESSMENT NOTE - STATUS
Thank you for choosing us for your care. I have placed an order for a prescription so that you can start treatment. View your full visit summary for details by clicking on the link below. Your pharmacist will able to address any questions you may have about the medication.     If you're not feeling better within 5-7 days, please schedule an appointment.  You can schedule an appointment right here in [facility], or call [phone]  If the visit is for the same symptoms as your eVisit, we'll refund the cost of your eVisit if seen within seven days.     awaiting hand specialist.

## 2023-11-01 ENCOUNTER — APPOINTMENT (OUTPATIENT)
Dept: MAMMOGRAPHY | Facility: CLINIC | Age: 58
End: 2023-11-01

## 2023-11-09 ENCOUNTER — APPOINTMENT (OUTPATIENT)
Dept: OBGYN | Facility: CLINIC | Age: 58
End: 2023-11-09
Payer: COMMERCIAL

## 2023-11-09 PROCEDURE — 99396 PREV VISIT EST AGE 40-64: CPT

## 2023-11-21 NOTE — ED ADULT NURSE NOTE - CONTEXT
unknown Doxycycline Counseling:  Patient counseled regarding possible photosensitivity and increased risk for sunburn.  Patient instructed to avoid sunlight, if possible.  When exposed to sunlight, patients should wear protective clothing, sunglasses, and sunscreen.  The patient was instructed to call the office immediately if the following severe adverse effects occur:  hearing changes, easy bruising/bleeding, severe headache, or vision changes.  The patient verbalized understanding of the proper use and possible adverse effects of doxycycline.  All of the patient's questions and concerns were addressed.

## 2025-01-23 ENCOUNTER — APPOINTMENT (OUTPATIENT)
Dept: OBGYN | Facility: CLINIC | Age: 60
End: 2025-01-23
Payer: COMMERCIAL

## 2025-01-23 ENCOUNTER — APPOINTMENT (OUTPATIENT)
Dept: OBGYN | Facility: CLINIC | Age: 60
End: 2025-01-23

## 2025-01-23 PROCEDURE — 96127 BRIEF EMOTIONAL/BEHAV ASSMT: CPT

## 2025-01-23 PROCEDURE — 99459 PELVIC EXAMINATION: CPT

## 2025-01-23 PROCEDURE — 76856 US EXAM PELVIC COMPLETE: CPT

## 2025-01-23 PROCEDURE — 76830 TRANSVAGINAL US NON-OB: CPT | Mod: 59

## 2025-01-23 PROCEDURE — 99396 PREV VISIT EST AGE 40-64: CPT | Mod: 25

## 2025-05-02 ENCOUNTER — APPOINTMENT (OUTPATIENT)
Dept: MAMMOGRAPHY | Facility: CLINIC | Age: 60
End: 2025-05-02
Payer: COMMERCIAL

## 2025-05-02 ENCOUNTER — OUTPATIENT (OUTPATIENT)
Dept: OUTPATIENT SERVICES | Facility: HOSPITAL | Age: 60
LOS: 1 days | End: 2025-05-02
Payer: COMMERCIAL

## 2025-05-02 DIAGNOSIS — Z98.89 OTHER SPECIFIED POSTPROCEDURAL STATES: Chronic | ICD-10-CM

## 2025-05-02 DIAGNOSIS — Z98.890 OTHER SPECIFIED POSTPROCEDURAL STATES: Chronic | ICD-10-CM

## 2025-05-02 DIAGNOSIS — Z00.8 ENCOUNTER FOR OTHER GENERAL EXAMINATION: ICD-10-CM

## 2025-05-02 DIAGNOSIS — M67.912 UNSPECIFIED DISORDER OF SYNOVIUM AND TENDON, LEFT SHOULDER: Chronic | ICD-10-CM

## 2025-05-02 PROCEDURE — 77067 SCR MAMMO BI INCL CAD: CPT | Mod: 26

## 2025-05-02 PROCEDURE — 77063 BREAST TOMOSYNTHESIS BI: CPT

## 2025-05-02 PROCEDURE — 77067 SCR MAMMO BI INCL CAD: CPT

## 2025-05-02 PROCEDURE — 77063 BREAST TOMOSYNTHESIS BI: CPT | Mod: 26
